# Patient Record
Sex: MALE | Race: WHITE | URBAN - METROPOLITAN AREA
[De-identification: names, ages, dates, MRNs, and addresses within clinical notes are randomized per-mention and may not be internally consistent; named-entity substitution may affect disease eponyms.]

---

## 2019-03-22 ENCOUNTER — INPATIENT (INPATIENT)
Facility: HOSPITAL | Age: 49
LOS: 1 days | Discharge: HOME | DRG: 247 | End: 2019-03-24
Attending: INTERNAL MEDICINE | Admitting: INTERNAL MEDICINE
Payer: COMMERCIAL

## 2019-03-22 VITALS
TEMPERATURE: 97 F | HEART RATE: 82 BPM | SYSTOLIC BLOOD PRESSURE: 176 MMHG | RESPIRATION RATE: 16 BRPM | OXYGEN SATURATION: 98 % | DIASTOLIC BLOOD PRESSURE: 101 MMHG

## 2019-03-22 DIAGNOSIS — Z91.89 OTHER SPECIFIED PERSONAL RISK FACTORS, NOT ELSEWHERE CLASSIFIED: ICD-10-CM

## 2019-03-22 DIAGNOSIS — I21.4 NON-ST ELEVATION (NSTEMI) MYOCARDIAL INFARCTION: ICD-10-CM

## 2019-03-22 DIAGNOSIS — Z95.5 PRESENCE OF CORONARY ANGIOPLASTY IMPLANT AND GRAFT: Chronic | ICD-10-CM

## 2019-03-22 DIAGNOSIS — I25.10 ATHEROSCLEROTIC HEART DISEASE OF NATIVE CORONARY ARTERY WITHOUT ANGINA PECTORIS: ICD-10-CM

## 2019-03-22 DIAGNOSIS — E11.9 TYPE 2 DIABETES MELLITUS WITHOUT COMPLICATIONS: ICD-10-CM

## 2019-03-22 DIAGNOSIS — R63.8 OTHER SYMPTOMS AND SIGNS CONCERNING FOOD AND FLUID INTAKE: ICD-10-CM

## 2019-03-22 LAB
ALBUMIN SERPL ELPH-MCNC: 4.5 G/DL — SIGNIFICANT CHANGE UP (ref 3.3–5)
ALP SERPL-CCNC: 79 U/L — SIGNIFICANT CHANGE UP (ref 40–120)
ALT FLD-CCNC: 26 U/L — SIGNIFICANT CHANGE UP (ref 10–45)
ANION GAP SERPL CALC-SCNC: 15 MMOL/L — SIGNIFICANT CHANGE UP (ref 5–17)
APTT BLD: 29 SEC — SIGNIFICANT CHANGE UP (ref 27.5–36.3)
AST SERPL-CCNC: 16 U/L — SIGNIFICANT CHANGE UP (ref 10–40)
BILIRUB SERPL-MCNC: 0.2 MG/DL — SIGNIFICANT CHANGE UP (ref 0.2–1.2)
BUN SERPL-MCNC: 15 MG/DL — SIGNIFICANT CHANGE UP (ref 7–23)
CALCIUM SERPL-MCNC: 9.6 MG/DL — SIGNIFICANT CHANGE UP (ref 8.4–10.5)
CHLORIDE SERPL-SCNC: 104 MMOL/L — SIGNIFICANT CHANGE UP (ref 96–108)
CK MB CFR SERPL CALC: 5.9 NG/ML — SIGNIFICANT CHANGE UP (ref 0–6.7)
CK SERPL-CCNC: 199 U/L — SIGNIFICANT CHANGE UP (ref 30–200)
CO2 SERPL-SCNC: 20 MMOL/L — LOW (ref 22–31)
CREAT SERPL-MCNC: 0.63 MG/DL — SIGNIFICANT CHANGE UP (ref 0.5–1.3)
GLUCOSE SERPL-MCNC: 205 MG/DL — HIGH (ref 70–99)
HCT VFR BLD CALC: 41.4 % — SIGNIFICANT CHANGE UP (ref 39–50)
HGB BLD-MCNC: 13.9 G/DL — SIGNIFICANT CHANGE UP (ref 13–17)
INR BLD: 1.1 — SIGNIFICANT CHANGE UP (ref 0.88–1.16)
MCHC RBC-ENTMCNC: 30.3 PG — SIGNIFICANT CHANGE UP (ref 27–34)
MCHC RBC-ENTMCNC: 33.6 GM/DL — SIGNIFICANT CHANGE UP (ref 32–36)
MCV RBC AUTO: 90.4 FL — SIGNIFICANT CHANGE UP (ref 80–100)
NRBC # BLD: 0 /100 WBCS — SIGNIFICANT CHANGE UP (ref 0–0)
PLATELET # BLD AUTO: 196 K/UL — SIGNIFICANT CHANGE UP (ref 150–400)
POTASSIUM SERPL-MCNC: 4 MMOL/L — SIGNIFICANT CHANGE UP (ref 3.5–5.3)
POTASSIUM SERPL-SCNC: 4 MMOL/L — SIGNIFICANT CHANGE UP (ref 3.5–5.3)
PROT SERPL-MCNC: 7.8 G/DL — SIGNIFICANT CHANGE UP (ref 6–8.3)
PROTHROM AB SERPL-ACNC: 12.5 SEC — SIGNIFICANT CHANGE UP (ref 10–12.9)
RBC # BLD: 4.58 M/UL — SIGNIFICANT CHANGE UP (ref 4.2–5.8)
RBC # FLD: 13 % — SIGNIFICANT CHANGE UP (ref 10.3–14.5)
SODIUM SERPL-SCNC: 139 MMOL/L — SIGNIFICANT CHANGE UP (ref 135–145)
TROPONIN T SERPL-MCNC: 0.06 NG/ML — CRITICAL HIGH (ref 0–0.01)
TROPONIN T SERPL-MCNC: 0.15 NG/ML — CRITICAL HIGH (ref 0–0.01)
TROPONIN T SERPL-MCNC: <0.01 NG/ML — SIGNIFICANT CHANGE UP (ref 0–0.01)
WBC # BLD: 9.09 K/UL — SIGNIFICANT CHANGE UP (ref 3.8–10.5)
WBC # FLD AUTO: 9.09 K/UL — SIGNIFICANT CHANGE UP (ref 3.8–10.5)

## 2019-03-22 PROCEDURE — 99291 CRITICAL CARE FIRST HOUR: CPT

## 2019-03-22 PROCEDURE — 93010 ELECTROCARDIOGRAM REPORT: CPT

## 2019-03-22 PROCEDURE — 92928 PRQ TCAT PLMT NTRAC ST 1 LES: CPT | Mod: LC

## 2019-03-22 PROCEDURE — 93458 L HRT ARTERY/VENTRICLE ANGIO: CPT | Mod: 26,XU

## 2019-03-22 PROCEDURE — 71045 X-RAY EXAM CHEST 1 VIEW: CPT | Mod: 26

## 2019-03-22 RX ORDER — DIPHENHYDRAMINE HCL 50 MG
50 CAPSULE ORAL ONCE
Qty: 0 | Refills: 0 | Status: DISCONTINUED | OUTPATIENT
Start: 2019-03-22 | End: 2019-03-23

## 2019-03-22 RX ORDER — FAMOTIDINE 10 MG/ML
20 INJECTION INTRAVENOUS ONCE
Qty: 0 | Refills: 0 | Status: COMPLETED | OUTPATIENT
Start: 2019-03-22 | End: 2019-03-22

## 2019-03-22 RX ORDER — TICAGRELOR 90 MG/1
180 TABLET ORAL ONCE
Qty: 0 | Refills: 0 | Status: COMPLETED | OUTPATIENT
Start: 2019-03-22 | End: 2019-03-22

## 2019-03-22 RX ORDER — HEPARIN SODIUM 5000 [USP'U]/ML
INJECTION INTRAVENOUS; SUBCUTANEOUS
Qty: 25000 | Refills: 0 | Status: DISCONTINUED | OUTPATIENT
Start: 2019-03-22 | End: 2019-03-22

## 2019-03-22 RX ORDER — LIDOCAINE 4 G/100G
15 CREAM TOPICAL ONCE
Qty: 0 | Refills: 0 | Status: COMPLETED | OUTPATIENT
Start: 2019-03-22 | End: 2019-03-22

## 2019-03-22 RX ORDER — TICAGRELOR 90 MG/1
90 TABLET ORAL EVERY 12 HOURS
Qty: 0 | Refills: 0 | Status: DISCONTINUED | OUTPATIENT
Start: 2019-03-23 | End: 2019-03-24

## 2019-03-22 RX ORDER — ASPIRIN/CALCIUM CARB/MAGNESIUM 324 MG
81 TABLET ORAL DAILY
Qty: 0 | Refills: 0 | Status: DISCONTINUED | OUTPATIENT
Start: 2019-03-23 | End: 2019-03-24

## 2019-03-22 RX ORDER — MORPHINE SULFATE 50 MG/1
4 CAPSULE, EXTENDED RELEASE ORAL ONCE
Qty: 0 | Refills: 0 | Status: DISCONTINUED | OUTPATIENT
Start: 2019-03-22 | End: 2019-03-22

## 2019-03-22 RX ORDER — NITROGLYCERIN 6.5 MG
0.4 CAPSULE, EXTENDED RELEASE ORAL
Qty: 0 | Refills: 0 | Status: DISCONTINUED | OUTPATIENT
Start: 2019-03-22 | End: 2019-03-23

## 2019-03-22 RX ORDER — NITROGLYCERIN 6.5 MG
5 CAPSULE, EXTENDED RELEASE ORAL
Qty: 50 | Refills: 0 | Status: DISCONTINUED | OUTPATIENT
Start: 2019-03-22 | End: 2019-03-23

## 2019-03-22 RX ORDER — ATORVASTATIN CALCIUM 80 MG/1
80 TABLET, FILM COATED ORAL ONCE
Qty: 0 | Refills: 0 | Status: COMPLETED | OUTPATIENT
Start: 2019-03-22 | End: 2019-03-22

## 2019-03-22 RX ORDER — ASPIRIN/CALCIUM CARB/MAGNESIUM 324 MG
162 TABLET ORAL ONCE
Qty: 0 | Refills: 0 | Status: COMPLETED | OUTPATIENT
Start: 2019-03-22 | End: 2019-03-22

## 2019-03-22 RX ORDER — HYDRALAZINE HCL 50 MG
10 TABLET ORAL ONCE
Qty: 0 | Refills: 0 | Status: COMPLETED | OUTPATIENT
Start: 2019-03-22 | End: 2019-03-22

## 2019-03-22 RX ORDER — HYDROCORTISONE 20 MG
200 TABLET ORAL ONCE
Qty: 0 | Refills: 0 | Status: COMPLETED | OUTPATIENT
Start: 2019-03-22 | End: 2019-03-22

## 2019-03-22 RX ORDER — HEPARIN SODIUM 5000 [USP'U]/ML
4000 INJECTION INTRAVENOUS; SUBCUTANEOUS ONCE
Qty: 0 | Refills: 0 | Status: COMPLETED | OUTPATIENT
Start: 2019-03-22 | End: 2019-03-22

## 2019-03-22 RX ORDER — LABETALOL HCL 100 MG
10 TABLET ORAL ONCE
Qty: 0 | Refills: 0 | Status: DISCONTINUED | OUTPATIENT
Start: 2019-03-22 | End: 2019-03-22

## 2019-03-22 RX ORDER — ONDANSETRON 8 MG/1
4 TABLET, FILM COATED ORAL EVERY 6 HOURS
Qty: 0 | Refills: 0 | Status: DISCONTINUED | OUTPATIENT
Start: 2019-03-22 | End: 2019-03-23

## 2019-03-22 RX ADMIN — LIDOCAINE 15 MILLILITER(S): 4 CREAM TOPICAL at 15:01

## 2019-03-22 RX ADMIN — Medication 10 MILLIGRAM(S): at 18:51

## 2019-03-22 RX ADMIN — HEPARIN SODIUM 4000 UNIT(S): 5000 INJECTION INTRAVENOUS; SUBCUTANEOUS at 19:02

## 2019-03-22 RX ADMIN — MORPHINE SULFATE 4 MILLIGRAM(S): 50 CAPSULE, EXTENDED RELEASE ORAL at 13:38

## 2019-03-22 RX ADMIN — FAMOTIDINE 20 MILLIGRAM(S): 10 INJECTION INTRAVENOUS at 15:01

## 2019-03-22 RX ADMIN — ATORVASTATIN CALCIUM 80 MILLIGRAM(S): 80 TABLET, FILM COATED ORAL at 21:41

## 2019-03-22 RX ADMIN — TICAGRELOR 180 MILLIGRAM(S): 90 TABLET ORAL at 18:47

## 2019-03-22 RX ADMIN — Medication 162 MILLIGRAM(S): at 13:38

## 2019-03-22 RX ADMIN — MORPHINE SULFATE 4 MILLIGRAM(S): 50 CAPSULE, EXTENDED RELEASE ORAL at 14:48

## 2019-03-22 RX ADMIN — Medication 200 MILLIGRAM(S): at 21:40

## 2019-03-22 RX ADMIN — Medication 0.4 MILLIGRAM(S): at 20:08

## 2019-03-22 RX ADMIN — HEPARIN SODIUM 1000 UNIT(S)/HR: 5000 INJECTION INTRAVENOUS; SUBCUTANEOUS at 19:02

## 2019-03-22 RX ADMIN — ONDANSETRON 4 MILLIGRAM(S): 8 TABLET, FILM COATED ORAL at 20:20

## 2019-03-22 RX ADMIN — Medication 30 MILLILITER(S): at 15:01

## 2019-03-22 RX ADMIN — MORPHINE SULFATE 4 MILLIGRAM(S): 50 CAPSULE, EXTENDED RELEASE ORAL at 20:19

## 2019-03-22 NOTE — ED ADULT NURSE REASSESSMENT NOTE - NS ED NURSE REASSESS COMMENT FT1
pt states "burning" pain in chest is worsening.  pt states when he had an MI the chest pain was "pressure".     Pt continues to report nausea.

## 2019-03-22 NOTE — ED PROVIDER NOTE - OBJECTIVE STATEMENT
hx of DM, CAD currently only on metformin, asa 81mg with complaints of chest pain for three days, worsening today while at lunch. Assoc radiation to L arm. Describes pain as burning in sensation to mid chest, no assoc sob, leg swelling.

## 2019-03-22 NOTE — H&P ADULT - NSHPPHYSICALEXAM_GEN_ALL_CORE
.  VITAL SIGNS:  T(C): 36.2 (03-22-19 @ 13:25), Max: 36.2 (03-22-19 @ 13:25)  T(F): 97.2 (03-22-19 @ 13:25), Max: 97.2 (03-22-19 @ 13:25)  HR: 48 (03-22-19 @ 20:31) (48 - 82)  BP: 105/68 (03-22-19 @ 20:31) (105/68 - 192/106)  BP(mean): --  RR: 20 (03-22-19 @ 20:31) (16 - 20)  SpO2: 99% (03-22-19 @ 20:03) (95% - 100%)  Wt(kg): --    PHYSICAL EXAM:    Constitutional: NAD  Head: NC/AT  Eyes: PERRL, EOMI, clear conjunctiva  ENT: no nasal discharge; uvula midline, no oropharyngeal erythema or exudates; MMM  Neck: supple; no JVD  Respiratory: CTA B/L; no W/R/R  Cardiac: +S1/S2; RRR; no M/R/G  Gastrointestinal: soft, NT/ND; no rebound or guarding; +BSx4  Extremities: Extremities warm. No erythema, no edema, no cyanosis b/l.  Vascular: 2+ radial, DP/PT pulses B/L  Neurologic: AAOx3; no focal deficits

## 2019-03-22 NOTE — ED PROVIDER NOTE - CLINICAL SUMMARY MEDICAL DECISION MAKING FREE TEXT BOX
Pt w CAD w complaints of CP, initial EKG wo STEMI and trop negative, but plan for admission for cont'd evaluation given hx and risk factors. After administering initial morphine and asa, pt stating CP free, comfortable appearing.

## 2019-03-22 NOTE — H&P ADULT - ASSESSMENT
Patient is a 48 year old M with PMH DM, CAD, who presented with 9/10 substernal chest pain x1 day with associated diaphoresis, nausea, vomiting and brief period of LOC found to have NSTEMI

## 2019-03-22 NOTE — H&P ADULT - PROBLEM SELECTOR PLAN 1
Patient with worsening and persistent chest pain since arrival to the ED, 10/10 and associated with nausea, vomiting, and diaphoresis that has persisted despite sublingual nitroglycerin. Briefly placed on nitro gtt but became bradycardic and nitro gtt was held. Repeat trops increased from 0.01 to 0.15 with worsening ST depressions in leads I, V4-V6. Patient s/p ASA and Brilinta load and started on heparin gtt  -patient going for cath tonight, f/u report  -c/w Lipitor 80mg   -c/w heparin gtt  -monitor PTTs q6hrs until therapeutic x3  -continue to trend cardiac enzymes until peak and serial EKGs q6-8hrs   -c/w Brilinta 90mg BID  -c/w ASA 81mg   -considering beta blocker and ACE/ARB. Holding for now  -f/u TSH  -f/u HbA1C  -f/u lipid panel Patient with worsening and persistent chest pain since arrival to the ED, 10/10 and associated with nausea, vomiting, and diaphoresis that has persisted despite sublingual nitroglycerin. Briefly placed on nitro gtt but became bradycardic and nitro gtt was held. Repeat trops increased from 0.01 to 0.15 with worsening ST depressions in leads I, V4-V6. Patient s/p ASA and Brilinta load and started on heparin gtt  -patient going for cath tonight, f/u report  -c/w Lipitor 80mg   -c/w heparin gtt, d/c after cath  -continue to trend cardiac enzymes until peak and serial EKGs q6-8hrs   -c/w Brilinta 90mg BID  -c/w ASA 81mg   -considering beta blocker and ACE/ARB. Holding for now  -f/u TSH  -f/u HbA1C  -f/u lipid panel  -groin check after procedure

## 2019-03-22 NOTE — H&P ADULT - PROBLEM SELECTOR PLAN 5
1) PCP Contacted on Admission: (Y/N) --> Name & Phone #:  Georgi Garcia (Cardiologist): [phone]  2) Date of Contact with PCP: Will need contact in AM vs. when office is open   3) PCP Contacted at Discharge: (Y/N, N/A)  4) Summary of Handoff Given to PCP:   5) Post-Discharge Appointment Date and Location: [facility]

## 2019-03-22 NOTE — ED PROVIDER NOTE - PROGRESS NOTE DETAILS
at time of admitting pt, pt now w worsening chest, rpt EKG obtained showing STD in lateral leads, rpt trop, nitro SL, morphine administered. Relayed to inpt team, fellow and resident to evaluate pt, will change admission to ICU.

## 2019-03-22 NOTE — ED ADULT NURSE NOTE - OBJECTIVE STATEMENT
pt reports nausea and "burning" chest pain onset 1140 today while at work.  pt does physical labor at work.    Denies vomiting.    pt denies SOB.   pt denies fevers.   pt reports headache after receiving NTG x 3 by EMS.   Pt denies relief of chest pain with NTG.   pt has hx of MI with cardiac stents.

## 2019-03-22 NOTE — H&P ADULT - NSHPSOCIALHISTORY_GEN_ALL_CORE
Tobacco use: Smoked 1.5 packs per day for 32 years. Recently cut back to 2-3 cigs/day, actively smoking.  EtOH use: Denies  Illicit drug use: Denies

## 2019-03-22 NOTE — ED PROVIDER NOTE - CRITICAL CARE PROVIDED
documentation/consultation with other physicians/interpretation of diagnostic studies/direct patient care (not related to procedure)

## 2019-03-22 NOTE — H&P ADULT - ATTENDING COMMENTS
Assessment: Patient personally seen and examined myself during rounds with the House Staff/Fellow  ON DATE 3/22/19  House Staff/Fellow note read, including vitals, physical findings, laboratory data, and radiological reports.   Revisions included below.   Direct personal management at bed side and extensive interpretation of the data.    Plan was outlined and discussed in details with the House Staff/Fellow.    Decision making of high complexity   Risk high of complications, morbidity, and/or mortality  Assessment and Action taken for acute disease activity to reflect the level of care provided:  -Hemodynamic evaluation and support  -ACS assessment and treatment as applicable  -Heart failure assessment and treatment as applicable  -Cardiac Telemetry reviewed  -Medication reconciliation  -Review laboratory data  -EKG reviewed   -Echo reviewed  -Interdisciplinary discussion with IC / EP / HF / CTS teams as needed  My plan includes :  close hemodynamic monitoring and management   Monitor for arrhythmias and monitor parameters for organ perfusion  monitor neurologic status  Head of the bed should remain elevated to 45 deg .   chest PT and IS will be encouraged  monitor adequacy of oxygenation and ventilation and attempt to wean oxygen  Nutritional goals will be met using po eventually , ensure adequate caloric intake and montior the same  Stress ulcer and VTE prophylaxis will be achieved    Glycemic control is satisfactory  Electrolytes have been replete as necessary and wound care has been carried out. Pain control has been achieved.   aggressive physical therapy and early mobility and ambulation goals will be met   The family was updated about the course and plan  CRITICAL CARE TIME SPENT in evaluation and management, reassessments, review and interpretation of labs and x-rays, ventilator and hemodynamic management, formulating a plan and coordinating care: ___90____ MIN.  Time does not include procedural time.    Carlie Ramirez MD  CCU ATTENDING  Mobile: [phone]

## 2019-03-22 NOTE — H&P ADULT - PROBLEM SELECTOR PLAN 3
Per Patient’s history. Patient is on Metformin 1000mg BID at home  -holding home PO meds while hospitalized  -f/u FSs  -ISS for now  -dose lantus and premeal insulin per ISS requirements

## 2019-03-22 NOTE — H&P ADULT - HISTORY OF PRESENT ILLNESS
Patient is a 48 year old M with PMH DM, CAD, who presented with 3 days chest pain, radiating down his L arm, acutely worsened since midday. At time of presentation in [facility] ED, VS T 36.2, P82, 176/101, RR16, sat 98% on room air. Labs with CBC WNL, CMP WNL. He initially was admitted to cardiac stepdown unit for r/o NSTEMI, however his chest pain worsened to 10/10, substernal, and he had rising troponins from <0.01 to 0.06 to 0.15. He was started on a nitroglycerin drip but became bradycardic to 40s. He was given ASA and brilinta, started on heparin drip. Will be taken for urgent catheterization. Patient is a 48 year old M with PMH DM, CAD, who presented with 9/10 substernal chest pain x1 day with associated diaphoresis, nausea, vomiting and brief period of LOC. 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 Patient subsequently went home where he became nauseous, vomited, and stated that he cannot recall the following events until his arrival to [facility] ED. Patient stated that his chest pain felt similar to his prior MI. Denied SOB, palpitations, exertional chest pain. Pain was not relieved by rest or sublingual nitroglycerin. At time of presentation in [facility] ED, VS T 36.2, P82, 176/101, RR16, sat 98% on room air. Labs with CBC WNL, CMP WNL. He initially was admitted to cardiac stepdown unit for r/o NSTEMI, however his chest pain worsened to 10/10, substernal, and he had rising troponins from <0.01 to 0.06 to 0.15 and EKG demonstrated worsening ST depressions in I, V4-V6. He was started on a nitroglycerin drip but became bradycardic to 40s. He was given ASA and brilinta, started on heparin drip and admitted to CCU. Will be taken for urgent catheterization given patient with persistent chest pain, although improved 1-2/10.

## 2019-03-22 NOTE — H&P ADULT - NSHPLABSRESULTS_GEN_ALL_CORE
.  LABS:                         13.9   9.09  )-----------( 196      ( 22 Mar 2019 13:41 )             41.4     03-22    139  |  104  |  15  ----------------------------<  205<H>  4.0   |  20<L>  |  0.63    Ca    9.6      22 Mar 2019 13:41    TPro  7.8  /  Alb  4.5  /  TBili  0.2  /  DBili  x   /  AST  16  /  ALT  26  /  AlkPhos  79  03-22    PT/INR - ( 22 Mar 2019 13:41 )   PT: 12.5 sec;   INR: 1.10          PTT - ( 22 Mar 2019 13:41 )  PTT:29.0 sec    CARDIAC MARKERS ( 22 Mar 2019 20:26 )  x     / 0.15 ng/mL / x     / x     / x      CARDIAC MARKERS ( 22 Mar 2019 17:26 )  x     / 0.06 ng/mL / 199 U/L / x     / 5.9 ng/mL  CARDIAC MARKERS ( 22 Mar 2019 13:41 )  x     / <0.01 ng/mL / x     / x     / x                RADIOLOGY, EKG & ADDITIONAL TESTS: Reviewed. .  LABS:                         13.9   9.09  )-----------( 196      ( 22 Mar 2019 13:41 )             41.4     03-22    139  |  104  |  15  ----------------------------<  205<H>  4.0   |  20<L>  |  0.63    Ca    9.6      22 Mar 2019 13:41    TPro  7.8  /  Alb  4.5  /  TBili  0.2  /  DBili  x   /  AST  16  /  ALT  26  /  AlkPhos  79  03-22    PT/INR - ( 22 Mar 2019 13:41 )   PT: 12.5 sec;   INR: 1.10          PTT - ( 22 Mar 2019 13:41 )  PTT:29.0 sec    CARDIAC MARKERS ( 22 Mar 2019 20:26 )  x     / 0.15 ng/mL / x     / x     / x      CARDIAC MARKERS ( 22 Mar 2019 17:26 )  x     / 0.06 ng/mL / 199 U/L / x     / 5.9 ng/mL  CARDIAC MARKERS ( 22 Mar 2019 13:41 )  x     / <0.01 ng/mL / x     / x     / x          RADIOLOGY, EKG & ADDITIONAL TESTS: Reviewed.

## 2019-03-22 NOTE — ED ADULT TRIAGE NOTE - CHIEF COMPLAINT QUOTE
Pt called in by ems as notification for non-stemi - pt c/o chest pain while at work today, not relieved with nitro, associated w near syncopal episode. Pt received 162 mg of aspirin and three nitro tabs from ems. Currently reports "burning" pain 8/10. EKG in progress. L AC #18 in place, hx of HTN, DM, AD w PCI x 3. Pt called in by ems as notification for NSTEMI - pt c/o chest pain while at work today, not relieved with nitro, associated w near syncopal episode. Pt received 162 mg of aspirin and three nitro tabs from ems. Currently reports "burning" pain 8/10. EKG in progress. L AC #18 in place, hx of HTN, DM, AD w PCI x 3.

## 2019-03-22 NOTE — H&P ADULT - PROBLEM SELECTOR PLAN 2
Patient with a history of CAD s/p 3 stents to LAD. 1 Stent was placed in 2017 and 2 more placed in 2018. Patient was on ASA and Plavix but Plavix was d/calixto 6 months ago per outside Cardiologist. Still on home ASA. Patient now presenting with NSTEMI as described above  -c/w plan as above

## 2019-03-22 NOTE — PROGRESS NOTE ADULT - SUBJECTIVE AND OBJECTIVE BOX
PROCEDURE: CORONARY ANGIOGRAM AND PCI  INDICATION: NSTEMI  ATTENDING: MANE  ACCESS: RRA    FIDINGS:   LM= normal   LCx= 100%   LAD= patent stent in prox mid and distal LAD with 10% ISR  D1= small vessel with 90% ostial lesion (jailed by LAD stent)  mRCA=40%    LVEF= 60%  LVEDP= 14 mmHg    INTERVENTION  PCI of LCX with MICHEAL x2 (2.5 x 26 and 3.0 x 18 post dilated with 3.0 x8 NC balloon) with restoration of ABRAHAM 3 flow    A/P  -aspirin 81 mg indefinitely   -brilinta 90 mg po bid for at least one year  -atorvastatin 80 mg daily   -smoking cessation

## 2019-03-22 NOTE — ED ADULT NURSE REASSESSMENT NOTE - NS ED NURSE REASSESS COMMENT FT1
RN called to bedside.  pt reports severe midsternal chest pain.  Rates 10/10.  pt appears anxious and uncomfortable.   Dr Dobson to bedside.  Repeat EKG done given to Dr. Dobson.   Vital signs currently stable.  NSR noted.

## 2019-03-22 NOTE — ED ADULT NURSE REASSESSMENT NOTE - NS ED NURSE REASSESS COMMENT FT1
cardiology fellow at bedside.  pt now sinus tommie.   /68. cardiology fellow at bedside.  pt now sinus tommie.   /68.  NTG gtt held because of VS change.   night shift RN Isabella received hand off and at beside with patient.

## 2019-03-22 NOTE — ED ADULT NURSE NOTE - CHIEF COMPLAINT QUOTE
Pt called in by ems as notification for NSTEMI - pt c/o chest pain while at work today, not relieved with nitro, associated w near syncopal episode. Pt received 162 mg of aspirin and three nitro tabs from ems. Currently reports "burning" pain 8/10. EKG in progress. L AC #18 in place, hx of HTN, DM, AD w PCI x 3.

## 2019-03-23 LAB
ANION GAP SERPL CALC-SCNC: 13 MMOL/L — SIGNIFICANT CHANGE UP (ref 5–17)
APTT BLD: 146.9 SEC — CRITICAL HIGH (ref 27.5–36.3)
BLD GP AB SCN SERPL QL: NEGATIVE — SIGNIFICANT CHANGE UP
BUN SERPL-MCNC: 18 MG/DL — SIGNIFICANT CHANGE UP (ref 7–23)
CALCIUM SERPL-MCNC: 9.5 MG/DL — SIGNIFICANT CHANGE UP (ref 8.4–10.5)
CHLORIDE SERPL-SCNC: 102 MMOL/L — SIGNIFICANT CHANGE UP (ref 96–108)
CHOLEST SERPL-MCNC: 182 MG/DL — SIGNIFICANT CHANGE UP (ref 10–199)
CO2 SERPL-SCNC: 21 MMOL/L — LOW (ref 22–31)
CREAT SERPL-MCNC: 0.57 MG/DL — SIGNIFICANT CHANGE UP (ref 0.5–1.3)
GLUCOSE BLDC GLUCOMTR-MCNC: 144 MG/DL — HIGH (ref 70–99)
GLUCOSE BLDC GLUCOMTR-MCNC: 145 MG/DL — HIGH (ref 70–99)
GLUCOSE BLDC GLUCOMTR-MCNC: 150 MG/DL — HIGH (ref 70–99)
GLUCOSE BLDC GLUCOMTR-MCNC: 231 MG/DL — HIGH (ref 70–99)
GLUCOSE SERPL-MCNC: 191 MG/DL — HIGH (ref 70–99)
HBA1C BLD-MCNC: 7.8 % — HIGH (ref 4–5.6)
HCT VFR BLD CALC: 39.3 % — SIGNIFICANT CHANGE UP (ref 39–50)
HCT VFR BLD CALC: 41.1 % — SIGNIFICANT CHANGE UP (ref 39–50)
HDLC SERPL-MCNC: 40 MG/DL — SIGNIFICANT CHANGE UP
HGB BLD-MCNC: 13.3 G/DL — SIGNIFICANT CHANGE UP (ref 13–17)
HGB BLD-MCNC: 13.8 G/DL — SIGNIFICANT CHANGE UP (ref 13–17)
LIPID PNL WITH DIRECT LDL SERPL: 107 MG/DL — SIGNIFICANT CHANGE UP
MAGNESIUM SERPL-MCNC: 2 MG/DL — SIGNIFICANT CHANGE UP (ref 1.6–2.6)
MCHC RBC-ENTMCNC: 30.4 PG — SIGNIFICANT CHANGE UP (ref 27–34)
MCHC RBC-ENTMCNC: 30.9 PG — SIGNIFICANT CHANGE UP (ref 27–34)
MCHC RBC-ENTMCNC: 33.6 GM/DL — SIGNIFICANT CHANGE UP (ref 32–36)
MCHC RBC-ENTMCNC: 33.8 GM/DL — SIGNIFICANT CHANGE UP (ref 32–36)
MCV RBC AUTO: 90.5 FL — SIGNIFICANT CHANGE UP (ref 80–100)
MCV RBC AUTO: 91.2 FL — SIGNIFICANT CHANGE UP (ref 80–100)
NRBC # BLD: 0 /100 WBCS — SIGNIFICANT CHANGE UP (ref 0–0)
NRBC # BLD: 0 /100 WBCS — SIGNIFICANT CHANGE UP (ref 0–0)
PCP SPEC-MCNC: SIGNIFICANT CHANGE UP
PHOSPHATE SERPL-MCNC: 3.7 MG/DL — SIGNIFICANT CHANGE UP (ref 2.5–4.5)
PLATELET # BLD AUTO: 188 K/UL — SIGNIFICANT CHANGE UP (ref 150–400)
PLATELET # BLD AUTO: 188 K/UL — SIGNIFICANT CHANGE UP (ref 150–400)
POTASSIUM SERPL-MCNC: 4.4 MMOL/L — SIGNIFICANT CHANGE UP (ref 3.5–5.3)
POTASSIUM SERPL-SCNC: 4.4 MMOL/L — SIGNIFICANT CHANGE UP (ref 3.5–5.3)
RBC # BLD: 4.31 M/UL — SIGNIFICANT CHANGE UP (ref 4.2–5.8)
RBC # BLD: 4.54 M/UL — SIGNIFICANT CHANGE UP (ref 4.2–5.8)
RBC # FLD: 13 % — SIGNIFICANT CHANGE UP (ref 10.3–14.5)
RBC # FLD: 13.1 % — SIGNIFICANT CHANGE UP (ref 10.3–14.5)
RH IG SCN BLD-IMP: POSITIVE — SIGNIFICANT CHANGE UP
SODIUM SERPL-SCNC: 136 MMOL/L — SIGNIFICANT CHANGE UP (ref 135–145)
TOTAL CHOLESTEROL/HDL RATIO MEASUREMENT: 4.6 RATIO — SIGNIFICANT CHANGE UP (ref 3.4–9.6)
TRIGL SERPL-MCNC: 173 MG/DL — HIGH (ref 10–149)
TSH SERPL-MCNC: 0.26 UIU/ML — LOW (ref 0.35–4.94)
WBC # BLD: 10.88 K/UL — HIGH (ref 3.8–10.5)
WBC # BLD: 12.64 K/UL — HIGH (ref 3.8–10.5)
WBC # FLD AUTO: 10.88 K/UL — HIGH (ref 3.8–10.5)
WBC # FLD AUTO: 12.64 K/UL — HIGH (ref 3.8–10.5)

## 2019-03-23 PROCEDURE — 93306 TTE W/DOPPLER COMPLETE: CPT | Mod: 26

## 2019-03-23 PROCEDURE — 99291 CRITICAL CARE FIRST HOUR: CPT

## 2019-03-23 PROCEDURE — 93010 ELECTROCARDIOGRAM REPORT: CPT

## 2019-03-23 PROCEDURE — 71045 X-RAY EXAM CHEST 1 VIEW: CPT | Mod: 26

## 2019-03-23 RX ORDER — DEXTROSE 50 % IN WATER 50 %
15 SYRINGE (ML) INTRAVENOUS ONCE
Qty: 0 | Refills: 0 | Status: DISCONTINUED | OUTPATIENT
Start: 2019-03-23 | End: 2019-03-24

## 2019-03-23 RX ORDER — DEXTROSE 50 % IN WATER 50 %
25 SYRINGE (ML) INTRAVENOUS ONCE
Qty: 0 | Refills: 0 | Status: DISCONTINUED | OUTPATIENT
Start: 2019-03-23 | End: 2019-03-24

## 2019-03-23 RX ORDER — INSULIN LISPRO 100/ML
VIAL (ML) SUBCUTANEOUS
Qty: 0 | Refills: 0 | Status: DISCONTINUED | OUTPATIENT
Start: 2019-03-23 | End: 2019-03-24

## 2019-03-23 RX ORDER — LISINOPRIL 2.5 MG/1
5 TABLET ORAL DAILY
Qty: 0 | Refills: 0 | Status: DISCONTINUED | OUTPATIENT
Start: 2019-03-23 | End: 2019-03-24

## 2019-03-23 RX ORDER — SODIUM CHLORIDE 9 MG/ML
1000 INJECTION, SOLUTION INTRAVENOUS
Qty: 0 | Refills: 0 | Status: DISCONTINUED | OUTPATIENT
Start: 2019-03-23 | End: 2019-03-24

## 2019-03-23 RX ORDER — HEPARIN SODIUM 5000 [USP'U]/ML
5000 INJECTION INTRAVENOUS; SUBCUTANEOUS EVERY 8 HOURS
Qty: 0 | Refills: 0 | Status: DISCONTINUED | OUTPATIENT
Start: 2019-03-23 | End: 2019-03-24

## 2019-03-23 RX ORDER — ATORVASTATIN CALCIUM 80 MG/1
80 TABLET, FILM COATED ORAL AT BEDTIME
Qty: 0 | Refills: 0 | Status: DISCONTINUED | OUTPATIENT
Start: 2019-03-23 | End: 2019-03-24

## 2019-03-23 RX ORDER — DEXTROSE 50 % IN WATER 50 %
12.5 SYRINGE (ML) INTRAVENOUS ONCE
Qty: 0 | Refills: 0 | Status: DISCONTINUED | OUTPATIENT
Start: 2019-03-23 | End: 2019-03-24

## 2019-03-23 RX ORDER — METOPROLOL TARTRATE 50 MG
12.5 TABLET ORAL EVERY 12 HOURS
Qty: 0 | Refills: 0 | Status: DISCONTINUED | OUTPATIENT
Start: 2019-03-23 | End: 2019-03-24

## 2019-03-23 RX ORDER — GLUCAGON INJECTION, SOLUTION 0.5 MG/.1ML
1 INJECTION, SOLUTION SUBCUTANEOUS ONCE
Qty: 0 | Refills: 0 | Status: DISCONTINUED | OUTPATIENT
Start: 2019-03-23 | End: 2019-03-24

## 2019-03-23 RX ORDER — METOPROLOL TARTRATE 50 MG
12.5 TABLET ORAL EVERY 12 HOURS
Qty: 0 | Refills: 0 | Status: DISCONTINUED | OUTPATIENT
Start: 2019-03-23 | End: 2019-03-23

## 2019-03-23 RX ORDER — CHLORHEXIDINE GLUCONATE 213 G/1000ML
1 SOLUTION TOPICAL
Qty: 0 | Refills: 0 | Status: DISCONTINUED | OUTPATIENT
Start: 2019-03-23 | End: 2019-03-24

## 2019-03-23 RX ORDER — ACETAMINOPHEN 500 MG
650 TABLET ORAL ONCE
Qty: 0 | Refills: 0 | Status: COMPLETED | OUTPATIENT
Start: 2019-03-23 | End: 2019-03-23

## 2019-03-23 RX ADMIN — Medication 650 MILLIGRAM(S): at 11:38

## 2019-03-23 RX ADMIN — HEPARIN SODIUM 5000 UNIT(S): 5000 INJECTION INTRAVENOUS; SUBCUTANEOUS at 22:13

## 2019-03-23 RX ADMIN — TICAGRELOR 90 MILLIGRAM(S): 90 TABLET ORAL at 18:01

## 2019-03-23 RX ADMIN — HEPARIN SODIUM 5000 UNIT(S): 5000 INJECTION INTRAVENOUS; SUBCUTANEOUS at 14:12

## 2019-03-23 RX ADMIN — Medication 650 MILLIGRAM(S): at 11:32

## 2019-03-23 RX ADMIN — TICAGRELOR 90 MILLIGRAM(S): 90 TABLET ORAL at 06:22

## 2019-03-23 RX ADMIN — Medication 12.5 MILLIGRAM(S): at 17:19

## 2019-03-23 RX ADMIN — HEPARIN SODIUM 5000 UNIT(S): 5000 INJECTION INTRAVENOUS; SUBCUTANEOUS at 06:22

## 2019-03-23 RX ADMIN — Medication 81 MILLIGRAM(S): at 11:36

## 2019-03-23 RX ADMIN — ATORVASTATIN CALCIUM 80 MILLIGRAM(S): 80 TABLET, FILM COATED ORAL at 22:13

## 2019-03-23 RX ADMIN — Medication 12.5 MILLIGRAM(S): at 13:09

## 2019-03-23 RX ADMIN — Medication 4: at 17:18

## 2019-03-23 RX ADMIN — LISINOPRIL 5 MILLIGRAM(S): 2.5 TABLET ORAL at 17:19

## 2019-03-23 NOTE — PROGRESS NOTE ADULT - SUBJECTIVE AND OBJECTIVE BOX
INTERVENTIONAL CARDIOLOGY FOLLOW UP NOTE    -Patient seen and examined this am    -No events overnight    -No complaints this am    VASCULAR ACCESS EXAM:    FEMORAL:    2+ right/left femoral pulse    2+ DP/PT pulses.    -Right femoral Access site clean, non-tender, without ecchymosis or hematoma.    A/P: Patient is a 48 year old M with PMH DMII  and CAD now s/p PCI of dLCx with MICHEAL via right femoral approach with no evidence of vascular complications post procedure.    -continue with current medications including dual antiplatelet therapy

## 2019-03-23 NOTE — PROGRESS NOTE ADULT - SUBJECTIVE AND OBJECTIVE BOX
OVERNIGHT EVENTS:    SUBJECTIVE / INTERVAL HPI: Patient seen and examined at bedside.     VITAL SIGNS:  Vital Signs Last 24 Hrs  T(C): 36.9 (23 Mar 2019 09:00), Max: 37.1 (23 Mar 2019 01:17)  T(F): 98.5 (23 Mar 2019 09:00), Max: 98.8 (23 Mar 2019 01:17)  HR: 70 (23 Mar 2019 09:00) (48 - 82)  BP: 121/80 (23 Mar 2019 09:00) (100/71 - 192/106)  BP(mean): 93 (23 Mar 2019 09:00) (73 - 120)  RR: 25 (23 Mar 2019 09:00) (12 - 28)  SpO2: 99% (23 Mar 2019 09:00) (95% - 100%)    PHYSICAL EXAM:    General: WDWN  HEENT: NC/AT; PERRL, anicteric sclera; MMM  Neck: supple  Cardiovascular: +S1/S2, RRR  Respiratory: CTA B/L; no W/R/R  Gastrointestinal: soft, NT/ND; +BSx4  Extremities: WWP; no edema, clubbing or cyanosis  Vascular: 2+ radial, DP/PT pulses B/L  Neurological: AAOx3; no focal deficits    MEDICATIONS:  MEDICATIONS  (STANDING):  aspirin  chewable 81 milliGRAM(s) Oral daily  atorvastatin 80 milliGRAM(s) Oral at bedtime  dextrose 5%. 1000 milliLiter(s) (50 mL/Hr) IV Continuous <Continuous>  dextrose 50% Injectable 12.5 Gram(s) IV Push once  dextrose 50% Injectable 25 Gram(s) IV Push once  dextrose 50% Injectable 25 Gram(s) IV Push once  heparin  Injectable 5000 Unit(s) SubCutaneous every 8 hours  insulin lispro (HumaLOG) corrective regimen sliding scale   SubCutaneous Before meals and at bedtime  ticagrelor 90 milliGRAM(s) Oral every 12 hours    MEDICATIONS  (PRN):  dextrose 40% Gel 15 Gram(s) Oral once PRN Blood Glucose LESS THAN 70 milliGRAM(s)/deciliter  diphenhydrAMINE   Injectable 50 milliGRAM(s) IV Push once PRN Allergy symptoms  glucagon  Injectable 1 milliGRAM(s) IntraMuscular once PRN Glucose LESS THAN 70 milligrams/deciliter      ALLERGIES:  Allergies    No Known Drug Allergies  Seafood (Unknown)    Intolerances        LABS:                        13.8   10.88 )-----------( 188      ( 23 Mar 2019 04:19 )             41.1     03-23    136  |  102  |  18  ----------------------------<  191<H>  4.4   |  21<L>  |  0.57    Ca    9.5      23 Mar 2019 04:19  Phos  3.7     03-23  Mg     2.0     03-23    TPro  7.8  /  Alb  4.5  /  TBili  0.2  /  DBili  x   /  AST  16  /  ALT  26  /  AlkPhos  79  03-22    PT/INR - ( 22 Mar 2019 13:41 )   PT: 12.5 sec;   INR: 1.10          PTT - ( 23 Mar 2019 01:21 )  PTT:146.9 sec    CAPILLARY BLOOD GLUCOSE      POCT Blood Glucose.: 150 mg/dL (23 Mar 2019 11:26)      RADIOLOGY & ADDITIONAL TESTS: Reviewed. OVERNIGHT EVENTS: s/p cath w 2 MICHEAL to LCx    SUBJECTIVE / INTERVAL HPI: Patient seen and examined at bedside. pt asymptomatic this AM. no CP, SOB, palpitations, lightheadedness, dizziness, n/v. slight headache this AM otherwise ROS negative    VITAL SIGNS:  Vital Signs Last 24 Hrs  T(C): 36.9 (23 Mar 2019 09:00), Max: 37.1 (23 Mar 2019 01:17)  T(F): 98.5 (23 Mar 2019 09:00), Max: 98.8 (23 Mar 2019 01:17)  HR: 70 (23 Mar 2019 09:00) (48 - 82)  BP: 121/80 (23 Mar 2019 09:00) (100/71 - 192/106)  BP(mean): 93 (23 Mar 2019 09:00) (73 - 120)  RR: 25 (23 Mar 2019 09:00) (12 - 28)  SpO2: 99% (23 Mar 2019 09:00) (95% - 100%)    PHYSICAL EXAM:    General: WDWN adult male in NAD  HEENT: NC/AT; PERRL, anicteric sclera; MMM  Neck: supple  Cardiovascular: +S1/S2, RRR  Respiratory: CTA B/L; no W/R/R  Gastrointestinal: soft, NT/ND; +BSx4  Extremities: WWP; no edema, clubbing or cyanosis  Vascular: 2+ radial, DP/PT pulses B/L  Neurological: AAOx3; no focal deficits    MEDICATIONS:  MEDICATIONS  (STANDING):  aspirin  chewable 81 milliGRAM(s) Oral daily  atorvastatin 80 milliGRAM(s) Oral at bedtime  dextrose 5%. 1000 milliLiter(s) (50 mL/Hr) IV Continuous <Continuous>  dextrose 50% Injectable 12.5 Gram(s) IV Push once  dextrose 50% Injectable 25 Gram(s) IV Push once  dextrose 50% Injectable 25 Gram(s) IV Push once  heparin  Injectable 5000 Unit(s) SubCutaneous every 8 hours  insulin lispro (HumaLOG) corrective regimen sliding scale   SubCutaneous Before meals and at bedtime  ticagrelor 90 milliGRAM(s) Oral every 12 hours    MEDICATIONS  (PRN):  dextrose 40% Gel 15 Gram(s) Oral once PRN Blood Glucose LESS THAN 70 milliGRAM(s)/deciliter  diphenhydrAMINE   Injectable 50 milliGRAM(s) IV Push once PRN Allergy symptoms  glucagon  Injectable 1 milliGRAM(s) IntraMuscular once PRN Glucose LESS THAN 70 milligrams/deciliter      ALLERGIES:  Allergies    No Known Drug Allergies  Seafood (Unknown)    Intolerances        LABS:                        13.8   10.88 )-----------( 188      ( 23 Mar 2019 04:19 )             41.1     03-23    136  |  102  |  18  ----------------------------<  191<H>  4.4   |  21<L>  |  0.57    Ca    9.5      23 Mar 2019 04:19  Phos  3.7     03-23  Mg     2.0     03-23    TPro  7.8  /  Alb  4.5  /  TBili  0.2  /  DBili  x   /  AST  16  /  ALT  26  /  AlkPhos  79  03-22    PT/INR - ( 22 Mar 2019 13:41 )   PT: 12.5 sec;   INR: 1.10          PTT - ( 23 Mar 2019 01:21 )  PTT:146.9 sec    CAPILLARY BLOOD GLUCOSE      POCT Blood Glucose.: 150 mg/dL (23 Mar 2019 11:26)      RADIOLOGY & ADDITIONAL TESTS: Reviewed.    CXR without acute infiltrates TRANSFER NOTE: CCU to [address] [place] [address] Course:  47 yo M w DM and CAD admitted w CP x1 day associated w n/v and diaphoresis admitted for NSTEMI s/p PCI w 2 MICHEAL to LCx. Pt remained stable and CP free after cath. Started lopressor 12.5 BID and pt's pressures tolerated well so also initiated lisinopril 5 daily. Stable for stepdown to tele unit.    OVERNIGHT EVENTS: s/p cath w 2 MICHEAL to LCx    SUBJECTIVE / INTERVAL HPI: Patient seen and examined at bedside. pt asymptomatic this AM. no CP, SOB, palpitations, lightheadedness, dizziness, n/v. slight headache this AM otherwise ROS negative    VITAL SIGNS:  Vital Signs Last 24 Hrs  T(C): 36.9 (23 Mar 2019 09:00), Max: 37.1 (23 Mar 2019 01:17)  T(F): 98.5 (23 Mar 2019 09:00), Max: 98.8 (23 Mar 2019 01:17)  HR: 70 (23 Mar 2019 09:00) (48 - 82)  BP: 121/80 (23 Mar 2019 09:00) (100/71 - 192/106)  BP(mean): 93 (23 Mar 2019 09:00) (73 - 120)  RR: 25 (23 Mar 2019 09:00) (12 - 28)  SpO2: 99% (23 Mar 2019 09:00) (95% - 100%)    PHYSICAL EXAM:    General: WDWN adult male in NAD  HEENT: NC/AT; PERRL, anicteric sclera; MMM  Neck: supple  Cardiovascular: +S1/S2, RRR  Respiratory: CTA B/L; no W/R/R  Gastrointestinal: soft, NT/ND; +BSx4  Extremities: WWP; no edema, clubbing or cyanosis  Vascular: 2+ radial, DP/PT pulses B/L  Neurological: AAOx3; no focal deficits    MEDICATIONS:  MEDICATIONS  (STANDING):  aspirin  chewable 81 milliGRAM(s) Oral daily  atorvastatin 80 milliGRAM(s) Oral at bedtime  dextrose 5%. 1000 milliLiter(s) (50 mL/Hr) IV Continuous <Continuous>  dextrose 50% Injectable 12.5 Gram(s) IV Push once  dextrose 50% Injectable 25 Gram(s) IV Push once  dextrose 50% Injectable 25 Gram(s) IV Push once  heparin  Injectable 5000 Unit(s) SubCutaneous every 8 hours  insulin lispro (HumaLOG) corrective regimen sliding scale   SubCutaneous Before meals and at bedtime  ticagrelor 90 milliGRAM(s) Oral every 12 hours    MEDICATIONS  (PRN):  dextrose 40% Gel 15 Gram(s) Oral once PRN Blood Glucose LESS THAN 70 milliGRAM(s)/deciliter  diphenhydrAMINE   Injectable 50 milliGRAM(s) IV Push once PRN Allergy symptoms  glucagon  Injectable 1 milliGRAM(s) IntraMuscular once PRN Glucose LESS THAN 70 milligrams/deciliter      ALLERGIES:  Allergies    No Known Drug Allergies  Seafood (Unknown)    Intolerances        LABS:                        13.8   10.88 )-----------( 188      ( 23 Mar 2019 04:19 )             41.1     03-23    136  |  102  |  18  ----------------------------<  191<H>  4.4   |  21<L>  |  0.57    Ca    9.5      23 Mar 2019 04:19  Phos  3.7     03-23  Mg     2.0     03-23    TPro  7.8  /  Alb  4.5  /  TBili  0.2  /  DBili  x   /  AST  16  /  ALT  26  /  AlkPhos  79  03-22    PT/INR - ( 22 Mar 2019 13:41 )   PT: 12.5 sec;   INR: 1.10          PTT - ( 23 Mar 2019 01:21 )  PTT:146.9 sec    CAPILLARY BLOOD GLUCOSE      POCT Blood Glucose.: 150 mg/dL (23 Mar 2019 11:26)      RADIOLOGY & ADDITIONAL TESTS: Reviewed.    CXR without acute infiltrates

## 2019-03-23 NOTE — PROGRESS NOTE ADULT - ASSESSMENT
48 yr old M current smoker with PMHx of DM, CAD, presented with chest pain associated with N/V, diaphoresis, R/I NSTEMI, s/p cath with successful MICHEAL x 2 to LCx, now deemed stable for stepdown for 5URIS for cardiac telemetry and discharge planning.

## 2019-03-23 NOTE — PROGRESS NOTE ADULT - PROBLEM SELECTOR PLAN 2
-- patient on Metformin 1000mg PO BID at home, currently on hold while inpatient.  -- will continue FS's and ICS PRN.    DVT PPx: Heparin subcut

## 2019-03-23 NOTE — PROGRESS NOTE ADULT - ATTENDING COMMENTS
Assessment: Patient personally seen and examined myself during rounds with the House Staff/Fellow  ON DATE 3/23/19  House Staff/Fellow note read, including vitals, physical findings, laboratory data, and radiological reports.   Revisions included below.   Direct personal management at bed side and extensive interpretation of the data.    Plan was outlined and discussed in details with the House Staff/Fellow.    Decision making of high complexity   Risk high of complications, morbidity, and/or mortality  Assessment and Action taken for acute disease activity to reflect the level of care provided:  -Hemodynamic evaluation and support  -ACS assessment and treatment as applicable  -Heart failure assessment and treatment as applicable  -Cardiac Telemetry reviewed  -Medication reconciliation  -Review laboratory data  -EKG reviewed   -Echo reviewed  -Interdisciplinary discussion with IC / EP / HF / CTS teams as needed  My plan includes :  close hemodynamic monitoring and management   Monitor for arrhythmias and monitor parameters for organ perfusion  monitor neurologic status  Head of the bed should remain elevated to 45 deg .   chest PT and IS will be encouraged  monitor adequacy of oxygenation and ventilation and attempt to wean oxygen  Nutritional goals will be met using po eventually , ensure adequate caloric intake and montior the same  Stress ulcer and VTE prophylaxis will be achieved    Glycemic control is satisfactory  Electrolytes have been replete as necessary and wound care has been carried out. Pain control has been achieved.   aggressive physical therapy and early mobility and ambulation goals will be met   The family was updated about the course and plan  CRITICAL CARE TIME SPENT in evaluation and management, reassessments, review and interpretation of labs and x-rays, ventilator and hemodynamic management, formulating a plan and coordinating care: ___90____ MIN.  Time does not include procedural time.    Carlie Ramirez MD  CCU ATTENDING  Mobile: [phone]

## 2019-03-23 NOTE — PROGRESS NOTE ADULT - PROBLEM SELECTOR PLAN 1
EKG NSR with worsening ST depressions in leads I, V4-V6, Troponin peaked at 0.15. Patient R/I NSTEMI, loaded with ASA/Brilinta and started on a Heparin gtt.    --cardiac cath 3/22/19 revealed normal LM, 100% LCx, patent stent in p/m/dLAD with 10% ISR, 90% ostial D1 lesion (small vessel), 40% mRCA, EF:60%, EDP 14mmHg.  Patient treated with successful MICHEAL x 2 to LCx.  -- continue ASA/Brilinta/Lipitor/Metoprolol Tartrate 12.5mg PO BID/Lisinopril 5mg PO daily.    Echo 3/23 revealed mild LVH, normal LV wall motion, EF:55-60%. Trace TR.

## 2019-03-23 NOTE — PROGRESS NOTE ADULT - ASSESSMENT
47 yo M w DM and CAD admitted w CP x1 day associated w n/v and diaphoresis admitted for NSTEMI s/p PCI w 2 MICHEAL to LCx.       NSTEMI (non-ST elevated myocardial infarction).  Plan: Patient with worsening and persistent chest pain since arrival to the ED, 10/10 and associated with nausea, vomiting, and diaphoresis that has persisted despite sublingual nitroglycerin. Briefly placed on nitro gtt but became bradycardic and nitro gtt was held. Repeat trops increased from 0.01 to 0.15 with worsening ST depressions in leads I, V4-V6. Patient s/p ASA and Brilinta load and started on heparin gtt  - s/p PCI w MICHEAL x 2 to LCx     Problem/Plan - 2:  ·  Problem: CAD (coronary atherosclerotic disease).  Plan: Patient with a history of CAD s/p 3 stents to LAD. 1 Stent was placed in 2017 and 2 more placed in 2018. Patient was on ASA and Plavix but Plavix was d/calixto 6 months ago per outside Cardiologist. Still on home ASA. Patient now presenting with NSTEMI as described above  -c/w plan as above.      Problem/Plan - 3:  ·  Problem: Diabetes.  Plan: Per Patient’s history. Patient is on Metformin 1000mg BID at home  -holding home PO meds while hospitalized  -f/u FSs  -ISS for now  -dose lantus and premeal insulin per ISS requirements.      Problem/Plan - 4:  ·  Problem: Nutrition, metabolism, and development symptoms.  Plan: F: No IVF  E: Replete PRN  N: NPO for cath  AC: Heparin gtt  FULL CODE  Dispo: CCU.      Problem/Plan - 5:  ·  Problem: Transition of care performed with sharing of clinical summary.  Plan: 1) PCP Contacted on Admission: (Y/N) --> Name & Phone #:  Georgi Garcia (Cardiologist): [phone]  2) Date of Contact with PCP: Will need contact in AM vs. when office is open   3) PCP Contacted at Discharge: (Y/N, N/A)  4) Summary of Handoff Given to PCP:   5) Post-Discharge Appointment Date and Location: [facility]. 49 yo M w DM and CAD admitted w CP x1 day associated w n/v and diaphoresis admitted for NSTEMI s/p PCI w 2 MICHEAL to LCx.       NSTEMI (non-ST elevated myocardial infarction).  Plan: Patient with worsening and persistent chest pain since arrival to the ED, 10/10 and associated with nausea, vomiting, and diaphoresis that has persisted despite sublingual nitroglycerin. Briefly placed on nitro gtt but became bradycardic and nitro gtt was held. Repeat trops increased from 0.01 to 0.15 with worsening ST depressions in leads I, V4-V6. Patient s/p ASA and Brilinta load and started on heparin gtt  - s/p PCI w MICHEAL x 2 to LCx  - c/w ASA/Brilinta  - c/w lipitor 80  - started lopressor 12.5 BID  - f/u echo  - consider starting ACEi pending how pt tolerates lopressor      CAD (coronary atherosclerotic disease).  Plan: Patient with a history of CAD s/p 3 stents to LAD. 1 Stent was placed in 2017 and 2 more placed in 2018. Patient was on ASA and Plavix but Plavix was d/calixto 6 months ago per outside Cardiologist. Still on home ASA. Patient now presenting with NSTEMI as described above  -c/w plan as above.       Diabetes.  Plan: Per Patient’s history. Patient is on Metformin 1000mg BID at home  -holding home PO meds while hospitalized  -f/u FSs  -ISS for now  -dose lantus and premeal insulin per ISS requirements.       Problem: Nutrition, metabolism, and development symptoms.  Plan: F: No IVF  E: Replete PRN  N: DASH/TLC/CC diet  AC: HSQ  FULL CODE  Dispo: CCU.     Transition of care performed with sharing of clinical summary.  Plan: 1) PCP Contacted on Admission: (Y/N) --> Name & Phone #:  Georgi Garcia (Cardiologist): [phone]  2) Date of Contact with PCP: Will need contact in AM vs. when office is open   3) PCP Contacted at Discharge: (Y/N, N/A)  4) Summary of Handoff Given to PCP:   5) Post-Discharge Appointment Date and Location: TBD.

## 2019-03-24 ENCOUNTER — TRANSCRIPTION ENCOUNTER (OUTPATIENT)
Age: 49
End: 2019-03-24

## 2019-03-24 ENCOUNTER — INBOUND DOCUMENT (OUTPATIENT)
Age: 49
End: 2019-03-24

## 2019-03-24 VITALS — TEMPERATURE: 98 F

## 2019-03-24 LAB
ANION GAP SERPL CALC-SCNC: 13 MMOL/L — SIGNIFICANT CHANGE UP (ref 5–17)
BUN SERPL-MCNC: 21 MG/DL — SIGNIFICANT CHANGE UP (ref 7–23)
CALCIUM SERPL-MCNC: 9.5 MG/DL — SIGNIFICANT CHANGE UP (ref 8.4–10.5)
CHLORIDE SERPL-SCNC: 102 MMOL/L — SIGNIFICANT CHANGE UP (ref 96–108)
CO2 SERPL-SCNC: 24 MMOL/L — SIGNIFICANT CHANGE UP (ref 22–31)
CREAT SERPL-MCNC: 0.77 MG/DL — SIGNIFICANT CHANGE UP (ref 0.5–1.3)
GLUCOSE BLDC GLUCOMTR-MCNC: 128 MG/DL — HIGH (ref 70–99)
GLUCOSE BLDC GLUCOMTR-MCNC: 139 MG/DL — HIGH (ref 70–99)
GLUCOSE BLDC GLUCOMTR-MCNC: 141 MG/DL — HIGH (ref 70–99)
GLUCOSE BLDC GLUCOMTR-MCNC: 181 MG/DL — HIGH (ref 70–99)
GLUCOSE SERPL-MCNC: 145 MG/DL — HIGH (ref 70–99)
HCT VFR BLD CALC: 41.2 % — SIGNIFICANT CHANGE UP (ref 39–50)
HGB BLD-MCNC: 13.5 G/DL — SIGNIFICANT CHANGE UP (ref 13–17)
MAGNESIUM SERPL-MCNC: 2 MG/DL — SIGNIFICANT CHANGE UP (ref 1.6–2.6)
MCHC RBC-ENTMCNC: 29.8 PG — SIGNIFICANT CHANGE UP (ref 27–34)
MCHC RBC-ENTMCNC: 32.8 GM/DL — SIGNIFICANT CHANGE UP (ref 32–36)
MCV RBC AUTO: 90.9 FL — SIGNIFICANT CHANGE UP (ref 80–100)
NRBC # BLD: 0 /100 WBCS — SIGNIFICANT CHANGE UP (ref 0–0)
PLATELET # BLD AUTO: 188 K/UL — SIGNIFICANT CHANGE UP (ref 150–400)
POTASSIUM SERPL-MCNC: 4.1 MMOL/L — SIGNIFICANT CHANGE UP (ref 3.5–5.3)
POTASSIUM SERPL-SCNC: 4.1 MMOL/L — SIGNIFICANT CHANGE UP (ref 3.5–5.3)
RBC # BLD: 4.53 M/UL — SIGNIFICANT CHANGE UP (ref 4.2–5.8)
RBC # FLD: 13.2 % — SIGNIFICANT CHANGE UP (ref 10.3–14.5)
SODIUM SERPL-SCNC: 139 MMOL/L — SIGNIFICANT CHANGE UP (ref 135–145)
T3FREE SERPL-MCNC: 2.72 PG/ML — SIGNIFICANT CHANGE UP (ref 1.71–3.71)
T4 FREE SERPL-MCNC: 0.97 NG/DL — SIGNIFICANT CHANGE UP (ref 0.7–1.48)
WBC # BLD: 9.27 K/UL — SIGNIFICANT CHANGE UP (ref 3.8–10.5)
WBC # FLD AUTO: 9.27 K/UL — SIGNIFICANT CHANGE UP (ref 3.8–10.5)

## 2019-03-24 PROCEDURE — 99232 SBSQ HOSP IP/OBS MODERATE 35: CPT | Mod: 25

## 2019-03-24 PROCEDURE — 99238 HOSP IP/OBS DSCHRG MGMT 30/<: CPT

## 2019-03-24 PROCEDURE — 71045 X-RAY EXAM CHEST 1 VIEW: CPT | Mod: 26

## 2019-03-24 RX ORDER — METOPROLOL TARTRATE 50 MG
0.5 TABLET ORAL
Qty: 30 | Refills: 2 | OUTPATIENT
Start: 2019-03-24 | End: 2019-06-21

## 2019-03-24 RX ORDER — METFORMIN HYDROCHLORIDE 850 MG/1
1 TABLET ORAL
Qty: 0 | Refills: 0 | COMMUNITY

## 2019-03-24 RX ORDER — METOPROLOL TARTRATE 50 MG
0.5 TABLET ORAL
Qty: 30 | Refills: 0 | OUTPATIENT
Start: 2019-03-24 | End: 2019-04-22

## 2019-03-24 RX ORDER — LISINOPRIL 2.5 MG/1
1 TABLET ORAL
Qty: 30 | Refills: 2 | OUTPATIENT
Start: 2019-03-24 | End: 2019-06-21

## 2019-03-24 RX ORDER — ATORVASTATIN CALCIUM 80 MG/1
1 TABLET, FILM COATED ORAL
Qty: 30 | Refills: 0 | OUTPATIENT
Start: 2019-03-24 | End: 2019-04-22

## 2019-03-24 RX ORDER — ASPIRIN/CALCIUM CARB/MAGNESIUM 324 MG
1 TABLET ORAL
Qty: 30 | Refills: 11 | OUTPATIENT
Start: 2019-03-24 | End: 2020-03-17

## 2019-03-24 RX ORDER — ASPIRIN/CALCIUM CARB/MAGNESIUM 324 MG
1 TABLET ORAL
Qty: 0 | Refills: 0 | COMMUNITY

## 2019-03-24 RX ORDER — TICAGRELOR 90 MG/1
1 TABLET ORAL
Qty: 60 | Refills: 10 | OUTPATIENT
Start: 2019-03-24 | End: 2020-02-16

## 2019-03-24 RX ORDER — TICAGRELOR 90 MG/1
1 TABLET ORAL
Qty: 60 | Refills: 0 | OUTPATIENT
Start: 2019-03-24 | End: 2019-04-22

## 2019-03-24 RX ADMIN — Medication 81 MILLIGRAM(S): at 11:58

## 2019-03-24 RX ADMIN — Medication 12.5 MILLIGRAM(S): at 18:30

## 2019-03-24 RX ADMIN — Medication 12.5 MILLIGRAM(S): at 05:31

## 2019-03-24 RX ADMIN — HEPARIN SODIUM 5000 UNIT(S): 5000 INJECTION INTRAVENOUS; SUBCUTANEOUS at 15:30

## 2019-03-24 RX ADMIN — TICAGRELOR 90 MILLIGRAM(S): 90 TABLET ORAL at 18:30

## 2019-03-24 RX ADMIN — Medication 0: at 21:35

## 2019-03-24 RX ADMIN — LISINOPRIL 5 MILLIGRAM(S): 2.5 TABLET ORAL at 05:30

## 2019-03-24 RX ADMIN — ATORVASTATIN CALCIUM 80 MILLIGRAM(S): 80 TABLET, FILM COATED ORAL at 21:34

## 2019-03-24 RX ADMIN — TICAGRELOR 90 MILLIGRAM(S): 90 TABLET ORAL at 05:30

## 2019-03-24 RX ADMIN — HEPARIN SODIUM 5000 UNIT(S): 5000 INJECTION INTRAVENOUS; SUBCUTANEOUS at 05:30

## 2019-03-24 RX ADMIN — Medication 2: at 11:02

## 2019-03-24 RX ADMIN — HEPARIN SODIUM 5000 UNIT(S): 5000 INJECTION INTRAVENOUS; SUBCUTANEOUS at 21:35

## 2019-03-24 NOTE — DISCHARGE NOTE NURSING/CASE MANAGEMENT/SOCIAL WORK - NSDCDPATPORTLINK_GEN_ALL_CORE
You can access the [facility] Patient Portal, offered by [facility], by registering with the following website: [email or web address]

## 2019-03-24 NOTE — DISCHARGE NOTE PROVIDER - NSDCCPCAREPLAN_GEN_ALL_CORE_FT
PRINCIPAL DISCHARGE DIAGNOSIS  Diagnosis: NSTEMI (non-ST elevated myocardial infarction)  Assessment and Plan of Treatment: CONTINUE ASPIRIN DAILY AND BRILINTA (TICAGRELOR) TWICE DAILY. Continue current listed medical regimen. See Dr. Ramirez and Primary Doctor this week    Monitor groin/leg for swelling, bleeding, discharge, pain or skin discoloration if any of these findings are noted go to nearest ER, seek medical attention immediately and call [phone] if any questions. If chest pain, shortness of breath, dizziness noted call MD immediately and seek medical attention.  Avoid hot tubs, swimming pools and baths for 1 week. You are not cleared to exercise or return to work until clearance by your cardiologist. Avoid lifting more than 3 pounds for one week, avoid exertional movements such intimacy, running, jumping.   You have been given a free month supply of Brilinta on discharge. The remaining 10 months of Brilinta were sent to your pharmacy.         SECONDARY DISCHARGE DIAGNOSES  Diagnosis: Diabetes  Assessment and Plan of Treatment: HOLD METFORMIN AND RESUME 3/25/19. Check fingerstick three times daily before meals and at bedtime. If fingerstick greater than 200 or less than 80 call MD for further instructions. Avoid concentrated sweets. Follow diabetic diet. MD follow-up. For low TSH and normal Free T3 and Free T4 lab blood tests, see Primary Doctor this week PRINCIPAL DISCHARGE DIAGNOSIS  Diagnosis: NSTEMI (non-ST elevated myocardial infarction)  Assessment and Plan of Treatment: CONTINUE ASPIRIN DAILY AND BRILINTA (TICAGRELOR) TWICE DAILY. Continue current listed medical regimen. See Dr. Ramirez and Primary Doctor this week    Monitor groin/leg for swelling, bleeding, discharge, pain or skin discoloration if any of these findings are noted go to nearest ER, seek medical attention immediately and call [phone] if any questions. If chest pain, shortness of breath, dizziness noted call MD immediately and seek medical attention.  Avoid hot tubs, swimming pools and baths for 1 week. You are not cleared to exercise or return to work until clearance by your cardiologist. Avoid lifting more than 3 pounds for one week, avoid exertional movements such intimacy, running, jumping.   You have been given a free month supply of Brilinta on discharge. The remaining 11 months of Brilinta were sent to your pharmacy.         SECONDARY DISCHARGE DIAGNOSES  Diagnosis: Diabetes  Assessment and Plan of Treatment: HOLD METFORMIN AND RESUME 3/25/19. Check fingerstick three times daily before meals and at bedtime. If fingerstick greater than 200 or less than 80 call MD for further instructions. Avoid concentrated sweets. Follow diabetic diet. MD follow-up. For low TSH and normal Free T3 and Free T4 lab blood tests, see Primary Doctor this week

## 2019-03-24 NOTE — DISCHARGE NOTE PROVIDER - HOSPITAL COURSE
49 y/o M w/ PMHX DM, CAD who presented w/ NSTEMI, s/p cardiac cath 3/22/19% MICHEAL x 2LCX, patent stent in p/m/dLAD with 10% ISR, 90% D1 (small vessel, jailed by LA stent), 40% mRCA, EF: 60%, EDP 14mmHg, admitted to CCU and stepped down to 5 Uris        NSTEMI     S/p PCI as above    Symptom free     Continue ASA 81mg daily    Continue Brilinta 90mg BID    Continue Lipitor 80mg QHS    Continue Lopressor 12.5mg BID    Continue Lisinopril 5mg daily    TTE showed EF 55-60%    Groin stable          TSH 0.26. Free T3/T4 normal. Outpatient follow-up per MD        Diabetes    Hold Metformin and resume on 3/25/19        Stable for D/C home as per Dr. Ramirez. See Dr. Ramirez this week. See Primary Doctor this week.        Pt seen and examined bedside.    Patient denies C/P, SOB, N/V, dizziness, palpitations, and diaphoresis.    Pt denies fever/chills, dysuria, abdominal pain, diarrhea, and cough    	    	    GEN: NAD    PULM:  CTA B/L    CARD: No JVD B/L, RRR, S1 and S2     ABD: +BS, NT, soft/ND	    EXT: No Edema B/L LE, Distal Pulses Intact and at Baseline     R GROIN: soft, no hematoma, no bleeding, no femoral bruit    NEURO: A+Ox3, no focal deficit                                13.5     9.27  )-----------( 188      ( 24 Mar 2019 07:36 )               41.2         03-24        139  |  102  |  21    ----------------------------<  145<H>    4.1   |  24  |  0.77        Ca    9.5      24 Mar 2019 07:36    Phos  3.7     03-23    Mg     2.0     03-24

## 2019-03-24 NOTE — DISCHARGE NOTE NURSING/CASE MANAGEMENT/SOCIAL WORK - NSDCPEEMAIL_GEN_ALL_CORE
[facility] for Tobacco Control email [email or web address]

## 2019-03-24 NOTE — DISCHARGE NOTE PROVIDER - CARE PROVIDER_API CALL
Carlie Ramirez)  Internal Medicine  [address] [place] [id number]  Phone: [phone]  Fax: [phone]  Follow Up Time:

## 2019-03-24 NOTE — DISCHARGE NOTE NURSING/CASE MANAGEMENT/SOCIAL WORK - NSDCPEWEB_GEN_ALL_CORE
[place] website --- [email or web address] Tobacco [email or web address] website --- [email or web address]

## 2019-03-26 PROBLEM — E11.9 TYPE 2 DIABETES MELLITUS WITHOUT COMPLICATIONS: Chronic | Status: ACTIVE | Noted: 2019-03-22

## 2019-03-26 PROBLEM — Z00.00 ENCOUNTER FOR PREVENTIVE HEALTH EXAMINATION: Status: ACTIVE | Noted: 2019-03-26

## 2019-03-26 PROBLEM — I25.10 ATHEROSCLEROTIC HEART DISEASE OF NATIVE CORONARY ARTERY WITHOUT ANGINA PECTORIS: Chronic | Status: ACTIVE | Noted: 2019-03-22

## 2019-03-26 PROBLEM — I21.9 ACUTE MYOCARDIAL INFARCTION, UNSPECIFIED: Chronic | Status: ACTIVE | Noted: 2019-03-22

## 2019-03-28 ENCOUNTER — APPOINTMENT (OUTPATIENT)
Dept: HEART AND VASCULAR | Facility: CLINIC | Age: 49
End: 2019-03-28
Payer: COMMERCIAL

## 2019-03-28 VITALS
WEIGHT: 211 LBS | HEIGHT: 74.02 IN | TEMPERATURE: 98.8 F | OXYGEN SATURATION: 99 % | HEART RATE: 94 BPM | SYSTOLIC BLOOD PRESSURE: 136 MMHG | BODY MASS INDEX: 27.08 KG/M2 | DIASTOLIC BLOOD PRESSURE: 86 MMHG

## 2019-03-28 DIAGNOSIS — F17.210 NICOTINE DEPENDENCE, CIGARETTES, UNCOMPLICATED: ICD-10-CM

## 2019-03-28 DIAGNOSIS — Z79.82 LONG TERM (CURRENT) USE OF ASPIRIN: ICD-10-CM

## 2019-03-28 DIAGNOSIS — Z95.5 PRESENCE OF CORONARY ANGIOPLASTY IMPLANT AND GRAFT: ICD-10-CM

## 2019-03-28 DIAGNOSIS — I25.110 ATHEROSCLEROTIC HEART DISEASE OF NATIVE CORONARY ARTERY WITH UNSTABLE ANGINA PECTORIS: ICD-10-CM

## 2019-03-28 DIAGNOSIS — I21.4 NON-ST ELEVATION (NSTEMI) MYOCARDIAL INFARCTION: ICD-10-CM

## 2019-03-28 DIAGNOSIS — I25.2 OLD MYOCARDIAL INFARCTION: ICD-10-CM

## 2019-03-28 DIAGNOSIS — E11.9 TYPE 2 DIABETES MELLITUS WITHOUT COMPLICATIONS: ICD-10-CM

## 2019-03-28 DIAGNOSIS — Z79.84 LONG TERM (CURRENT) USE OF ORAL HYPOGLYCEMIC DRUGS: ICD-10-CM

## 2019-03-28 PROCEDURE — 99401 PREV MED CNSL INDIV APPRX 15: CPT

## 2019-03-28 PROCEDURE — 96161 CAREGIVER HEALTH RISK ASSMT: CPT

## 2019-03-28 PROCEDURE — 99215 OFFICE O/P EST HI 40 MIN: CPT | Mod: 25

## 2019-03-28 PROCEDURE — 99358 PROLONG SERVICE W/O CONTACT: CPT

## 2019-03-28 PROCEDURE — 93000 ELECTROCARDIOGRAM COMPLETE: CPT | Mod: 59

## 2019-03-28 NOTE — REASON FOR VISIT
[Follow-Up - From Hospitalization] : follow-up of a recent hospitalization for [Coronary Artery Disease] : coronary artery disease [Discharge Date: ___] : Discharge Date: [unfilled]

## 2019-03-28 NOTE — HISTORY OF PRESENT ILLNESS
[FreeTextEntry1] : 47 y/o M w/ PMHX DM, CAD who presented w/ NSTEMI, s/p cardiac cath 3/22/19% MICHEAL \par x 2LCX, patent stent in p/m/dLAD with 10% ISR, 90% D1 (small vessel, jailed by \par LA stent), 40% mRCA, EF: 60%, EDP 14mmHg,

## 2019-03-28 NOTE — PHYSICAL EXAM
[General Appearance - Well Developed] : well developed [Normal Appearance] : normal appearance [Well Groomed] : well groomed [General Appearance - Well Nourished] : well nourished [No Deformities] : no deformities [General Appearance - In No Acute Distress] : no acute distress [Normal Conjunctiva] : the conjunctiva exhibited no abnormalities [Eyelids - No Xanthelasma] : the eyelids demonstrated no xanthelasmas [Normal Oral Mucosa] : normal oral mucosa [No Oral Pallor] : no oral pallor [No Oral Cyanosis] : no oral cyanosis [Normal Jugular Venous A Waves Present] : normal jugular venous A waves present [Normal Jugular Venous V Waves Present] : normal jugular venous V waves present [No Jugular Venous Liz A Waves] : no jugular venous liz A waves [Respiration, Rhythm And Depth] : normal respiratory rhythm and effort [Exaggerated Use Of Accessory Muscles For Inspiration] : no accessory muscle use [Auscultation Breath Sounds / Voice Sounds] : lungs were clear to auscultation bilaterally [Heart Rate And Rhythm] : heart rate and rhythm were normal [Heart Sounds] : normal S1 and S2 [Murmurs] : no murmurs present [Abdomen Soft] : soft [Abdomen Tenderness] : non-tender [Abdomen Mass (___ Cm)] : no abdominal mass palpated [Abnormal Walk] : normal gait [Gait - Sufficient For Exercise Testing] : the gait was sufficient for exercise testing [Nail Clubbing] : no clubbing of the fingernails [Cyanosis, Localized] : no localized cyanosis [Petechial Hemorrhages (___cm)] : no petechial hemorrhages [Skin Color & Pigmentation] : normal skin color and pigmentation [] : no rash [No Venous Stasis] : no venous stasis [Skin Lesions] : no skin lesions [No Skin Ulcers] : no skin ulcer [No Xanthoma] : no  xanthoma was observed [Oriented To Time, Place, And Person] : oriented to person, place, and time [Affect] : the affect was normal [Mood] : the mood was normal [No Anxiety] : not feeling anxious

## 2019-03-28 NOTE — DISCUSSION/SUMMARY
[FreeTextEntry1] : The number of diagnostic and/or management options \par 47 y/o M w/ PMHX DM, CAD who presented w/ NSTEMI, s/p cardiac cath 3/22/19% MICHEAL \par x 2LCX, patent stent in p/m/dLAD with 10% ISR, 90% D1 (small vessel, jailed by \par LA stent), 40% mRCA, EF: 60%, EDP 14mmHg,\par \par GDMT OM DAPT\par no CP\par site c/d/i\par EF preserved\par pcp dr ga given\par \par Labs, radiology: ekg echo hospital records\par \par Aspirin therapy: yes\par \par LDL: statin\par \par Overall Risk: High Complexity\par \par 34 min review of recent hospital inpatient admission, progress and discharge records prior to patients visit to assist in this exam. \par \par Administration of PHQ-2/9 for the benefit of the patient\par Score = 0\par Rx = Reassurance provided\par  \par Prevention counseling 17min\par Patient was competent and alert at the time of the counseling provided. Will reinforce subsequent visit.\par ·	The patient’s blood pressure goal SBP<130mmHg\par ·	Advised Mediterranean diet discussion, No salt diet - including increased CAD PAD and mortality \par ·	Assessed willingness to attempt - contemplation stage\par ·	Providing methods and skills for prevention - prevention medicine referral, nutritionist, cbt therapy\par ·	Medication management - n/a\par ·	Resources provided - prevention medicine referral, nutritionist, cbt therapy, group counseling, individual counseling, cbt therapy\par ·	Setting goals - not ready to commit to a date              \par ·	Follow-up arranged - next scheduled visit\par \par

## 2019-04-10 PROCEDURE — 86901 BLOOD TYPING SEROLOGIC RH(D): CPT

## 2019-04-10 PROCEDURE — 83735 ASSAY OF MAGNESIUM: CPT

## 2019-04-10 PROCEDURE — 84481 FREE ASSAY (FT-3): CPT

## 2019-04-10 PROCEDURE — C1894: CPT

## 2019-04-10 PROCEDURE — 36415 COLL VENOUS BLD VENIPUNCTURE: CPT

## 2019-04-10 PROCEDURE — 85610 PROTHROMBIN TIME: CPT

## 2019-04-10 PROCEDURE — C1887: CPT

## 2019-04-10 PROCEDURE — 84439 ASSAY OF FREE THYROXINE: CPT

## 2019-04-10 PROCEDURE — 84484 ASSAY OF TROPONIN QUANT: CPT

## 2019-04-10 PROCEDURE — 84100 ASSAY OF PHOSPHORUS: CPT

## 2019-04-10 PROCEDURE — 96374 THER/PROPH/DIAG INJ IV PUSH: CPT

## 2019-04-10 PROCEDURE — 86850 RBC ANTIBODY SCREEN: CPT

## 2019-04-10 PROCEDURE — 71045 X-RAY EXAM CHEST 1 VIEW: CPT

## 2019-04-10 PROCEDURE — 93005 ELECTROCARDIOGRAM TRACING: CPT

## 2019-04-10 PROCEDURE — 85027 COMPLETE CBC AUTOMATED: CPT

## 2019-04-10 PROCEDURE — 86900 BLOOD TYPING SEROLOGIC ABO: CPT

## 2019-04-10 PROCEDURE — 80061 LIPID PANEL: CPT

## 2019-04-10 PROCEDURE — 93306 TTE W/DOPPLER COMPLETE: CPT

## 2019-04-10 PROCEDURE — 85730 THROMBOPLASTIN TIME PARTIAL: CPT

## 2019-04-10 PROCEDURE — 80307 DRUG TEST PRSMV CHEM ANLYZR: CPT

## 2019-04-10 PROCEDURE — C1889: CPT

## 2019-04-10 PROCEDURE — C1760: CPT

## 2019-04-10 PROCEDURE — 82550 ASSAY OF CK (CPK): CPT

## 2019-04-10 PROCEDURE — 83036 HEMOGLOBIN GLYCOSYLATED A1C: CPT

## 2019-04-10 PROCEDURE — 80048 BASIC METABOLIC PNL TOTAL CA: CPT

## 2019-04-10 PROCEDURE — 96375 TX/PRO/DX INJ NEW DRUG ADDON: CPT

## 2019-04-10 PROCEDURE — C1769: CPT

## 2019-04-10 PROCEDURE — 80053 COMPREHEN METABOLIC PANEL: CPT

## 2019-04-10 PROCEDURE — 82553 CREATINE MB FRACTION: CPT

## 2019-04-10 PROCEDURE — C1874: CPT

## 2019-04-10 PROCEDURE — 99291 CRITICAL CARE FIRST HOUR: CPT | Mod: 25

## 2019-04-10 PROCEDURE — 84443 ASSAY THYROID STIM HORMONE: CPT

## 2019-04-10 PROCEDURE — C1725: CPT

## 2019-04-10 PROCEDURE — 82962 GLUCOSE BLOOD TEST: CPT

## 2019-06-27 ENCOUNTER — APPOINTMENT (OUTPATIENT)
Dept: HEART AND VASCULAR | Facility: CLINIC | Age: 49
End: 2019-06-27

## 2021-12-28 ENCOUNTER — TRANSCRIPTION ENCOUNTER (OUTPATIENT)
Age: 51
End: 2021-12-28

## 2022-01-20 ENCOUNTER — APPOINTMENT (OUTPATIENT)
Dept: ORTHOPEDIC SURGERY | Facility: CLINIC | Age: 52
End: 2022-01-20
Payer: COMMERCIAL

## 2022-01-20 VITALS — WEIGHT: 220 LBS | BODY MASS INDEX: 27.35 KG/M2 | HEIGHT: 75 IN

## 2022-01-20 DIAGNOSIS — M54.2 CERVICALGIA: ICD-10-CM

## 2022-01-20 DIAGNOSIS — M25.512 PAIN IN LEFT SHOULDER: ICD-10-CM

## 2022-01-20 DIAGNOSIS — M89.8X1 OTHER SPECIFIED DISORDERS OF BONE, SHOULDER: ICD-10-CM

## 2022-01-20 PROCEDURE — 72040 X-RAY EXAM NECK SPINE 2-3 VW: CPT

## 2022-01-20 PROCEDURE — 73030 X-RAY EXAM OF SHOULDER: CPT | Mod: LT

## 2022-01-20 PROCEDURE — 99203 OFFICE O/P NEW LOW 30 MIN: CPT

## 2022-01-20 NOTE — ASSESSMENT
[FreeTextEntry1] : Discussed at length with patient exam history and imaging as well as treatment options.  Recommendation at this time for MRI evaluation left shoulder given symptoms and particularly weakness.  Patient agrees with plan

## 2022-01-20 NOTE — PHYSICAL EXAM
[de-identified] : Left Shoulder:\par Constitutional:\par The patient is healthy-appearing and in no apparent distress. \par \par Cardiovascular System: \par The capillary refill is less than 2 seconds. \par \par Skin: \par There are no skin abnormalities.\par \par C-Spine/Neck:\par \par Active Range of Motion:\par Flexion				50\par Extension			60\par Lateral rotation			80  \par \par Left Shoulder: \par Inspection: \par There is no atrophy, erythema, warmth, swelling.\par There is no scapular winging.\par There is no AC prominence. \par \par Bony Palpation: \par There is no tenderness of the clavicle.\par There is no tenderness of the acromioclavicular joint.\par There is tenderness of the greater tuberosity. \par There is no tenderness of the bicipital groove.\par  \par Soft Tissue Palpation: \par There is no tenderness of the trapezius.\par There is no tenderness of the rhomboid.\par There is tenderness of the subacromial bursa. \par \par Active Range of Motion: \par Forward flexion- 				160 \par Abduction-					80\par External rotation at 0 degrees abduction-	60 \par Internal rotation at 0 degrees abduction-	80\par \par Passive Range of Motion: \par Forward flexion- 			170 \par Abduction-				100\par External rotation at 0 deg abduction-	70 \par Internal rotation at 0 deg abduction-	80\par \par Strength:\par Supraspinatus / Abduction                  4/5\par External rotation                                 4/5\par Internal rotation                                  5/5\par \par Special Tests: \par Hawkin's  				Positive \par Neer's  				Positive \par Speed's  				Negative\par AC cross-over 			            Negative\par [place]'s  				Negative\par \par Neurological System: \par \par There is normal sensation to light touch C5-T1. \par \par Stability: \par There is no general laxity. \par \par Psychiatric: \par The patient demonstrates a normal mood and affect and is active and alert\par  [de-identified] : Given patient's reported history and physical examination, x-ray evaluation ( as listed below ) was ordered and performed to aid in diagnosis and treatment of the patient.\par X-ray left shoulder.  There is no significant bony / soft tissue abnormality, arthritis, or fracture.\par X-rays cervical spine.  There is minimal midcervical arthritis with mild loss of lordosis.

## 2022-01-20 NOTE — HISTORY OF PRESENT ILLNESS
[de-identified] : Location: Left shoulder- lateral\par Duration: 2-3 months\par Context: atraumatic\par Quality: sharp\par Aggravating factors: ROM, sleep\par Associated symptoms: neck pain, clicking\par Conservative treatment: Tylenol, Advil, icy hot\par Prior studies: N/A

## 2022-01-21 ENCOUNTER — TRANSCRIPTION ENCOUNTER (OUTPATIENT)
Age: 52
End: 2022-01-21

## 2022-02-01 ENCOUNTER — APPOINTMENT (OUTPATIENT)
Dept: ORTHOPEDIC SURGERY | Facility: CLINIC | Age: 52
End: 2022-02-01
Payer: COMMERCIAL

## 2022-02-01 DIAGNOSIS — M75.50 BURSITIS OF UNSPECIFIED SHOULDER: ICD-10-CM

## 2022-02-01 PROCEDURE — 99213 OFFICE O/P EST LOW 20 MIN: CPT | Mod: 25

## 2022-02-01 PROCEDURE — 20610 DRAIN/INJ JOINT/BURSA W/O US: CPT | Mod: LT

## 2022-02-01 NOTE — PHYSICAL EXAM
[de-identified] : Left Shoulder:\par \par Constitutional:\par The patient is healthy-appearing and in no apparent distress. \par \par Cardiovascular System: \par The capillary refill is less than 2 seconds. \par \par Skin: \par There are no skin abnormalities.\par \par Left Shoulder: \par Inspection: \par There is no atrophy, erythema, warmth, swelling.\par There is no scapular winging.\par There is no AC prominence. \par \par Bony Palpation: \par There is no tenderness of the clavicle.\par There is no tenderness of the acromioclavicular joint.\par There is tenderness of the greater tuberosity. \par There is no tenderness of the bicipital groove.\par  \par Soft Tissue Palpation: \par There is no tenderness of the trapezius.\par There is no tenderness of the rhomboid.\par There is tenderness of the subacromial bursa. \par \par Active Range of Motion: \par Forward flexion- 				160 \par Abduction-					80\par External rotation at 0 degrees abduction-	60 \par Internal rotation at 0 degrees abduction-	80\par \par Passive Range of Motion: \par Forward flexion- 			170 \par Abduction-				100\par External rotation at 0 deg abduction-	70 \par Internal rotation at 0 deg abduction-	80\par \par Strength:\par Supraspinatus / Abduction                  4+/5\par External rotation                                 5/5\par Internal rotation                                  5/5\par \par Special Tests: \par Hawkin's  				Positive \par Neer's  				Positive \par \par Neurological System: \par \par There is normal sensation to light touch C5-T1. \par \par Stability: \par There is no general laxity. \par \par Psychiatric: \par The patient demonstrates a normal mood and affect and is active and alert\par  [de-identified] : MRI of left shoulder.  There is rotator cuff tendinitis and bursitis

## 2022-02-01 NOTE — PROCEDURE
[de-identified] : Patient has demonstrated limited relief from NSAIDS, rest, exercises / PT, and after discussion of the risks and benefits, the patient has elected to proceed with a corticosteroid injection into the LEFT shoulder via Posterolateral site.\par Confirmed that the patient does not have history of prior adverse reactions, active, infections, or relevant allergies.   There was no erythema or warmth, and the skin was clear.  The skin was sterilized with alcohol and via sterile technique, the shoulder was injected with 3 cc of 1% xylocaine and 40 mg of Kenalog.  The injection was completed without complication and a bandage was applied.  The patient tolerated the procedure well and was given post-injection instructions.

## 2022-02-01 NOTE — HISTORY OF PRESENT ILLNESS
[de-identified] : Patient is an established patient presenting for further evaluation and treatment discussion regards to left shoulder pain.  He had an MRI which revealed tendinitis and bursitis but no evidence of a clear rotator cuff tear.  He states persistent discomfort despite home exercises and activity modification

## 2022-02-01 NOTE — ASSESSMENT
[FreeTextEntry1] : Discussed at length with patient exam treatment options an MRI reviewed again.  At this time elects cortisone injection and encouraged to continue physical therapy and home exercises.  In 3-4 weeks patient to follow up in office and informed ultimately if therapy nonoperative treatment he may require an arthroscopy.  Patient agrees with plan

## 2023-10-12 ENCOUNTER — NON-APPOINTMENT (OUTPATIENT)
Age: 53
End: 2023-10-12

## 2024-02-11 ENCOUNTER — NON-APPOINTMENT (OUTPATIENT)
Age: 54
End: 2024-02-11

## 2024-02-20 NOTE — PATIENT PROFILE ADULT - NSTOBACCOCESSATIONEDU3_GEN_A_NUR
Statement Selected
Learning behavioral activities to cope with urges.  For example, distraction and changing routines

## 2024-02-28 ENCOUNTER — NON-APPOINTMENT (OUTPATIENT)
Age: 54
End: 2024-02-28

## 2024-04-15 ENCOUNTER — NON-APPOINTMENT (OUTPATIENT)
Age: 54
End: 2024-04-15

## 2024-04-26 ENCOUNTER — APPOINTMENT (OUTPATIENT)
Dept: ORTHOPEDIC SURGERY | Facility: CLINIC | Age: 54
End: 2024-04-26
Payer: COMMERCIAL

## 2024-04-26 VITALS — HEIGHT: 75 IN | WEIGHT: 212 LBS | BODY MASS INDEX: 26.36 KG/M2

## 2024-04-26 DIAGNOSIS — Z80.9 FAMILY HISTORY OF MALIGNANT NEOPLASM, UNSPECIFIED: ICD-10-CM

## 2024-04-26 DIAGNOSIS — F17.200 NICOTINE DEPENDENCE, UNSPECIFIED, UNCOMPLICATED: ICD-10-CM

## 2024-04-26 DIAGNOSIS — Z86.39 PERSONAL HISTORY OF OTHER ENDOCRINE, NUTRITIONAL AND METABOLIC DISEASE: ICD-10-CM

## 2024-04-26 DIAGNOSIS — Z86.79 PERSONAL HISTORY OF OTHER DISEASES OF THE CIRCULATORY SYSTEM: ICD-10-CM

## 2024-04-26 DIAGNOSIS — M77.01 MEDIAL EPICONDYLITIS, RIGHT ELBOW: ICD-10-CM

## 2024-04-26 PROCEDURE — 99214 OFFICE O/P EST MOD 30 MIN: CPT | Mod: 25

## 2024-04-26 PROCEDURE — 20605 DRAIN/INJ JOINT/BURSA W/O US: CPT | Mod: RT

## 2024-04-26 PROCEDURE — 99204 OFFICE O/P NEW MOD 45 MIN: CPT | Mod: 25

## 2024-04-26 RX ORDER — NABUMETONE 500 MG/1
500 TABLET, FILM COATED ORAL
Qty: 30 | Refills: 2 | Status: DISCONTINUED | COMMUNITY
Start: 2022-01-20 | End: 2024-04-26

## 2024-04-26 RX ORDER — METAXALONE 800 MG/1
800 TABLET ORAL 3 TIMES DAILY
Qty: 30 | Refills: 0 | Status: DISCONTINUED | COMMUNITY
Start: 2022-01-20 | End: 2024-04-26

## 2024-04-26 RX ORDER — MELOXICAM 15 MG/1
15 TABLET ORAL
Qty: 30 | Refills: 1 | Status: ACTIVE | COMMUNITY
Start: 2024-04-26 | End: 1900-01-01

## 2024-04-26 RX ORDER — INSULIN GLARGINE 100 [IU]/ML
100 INJECTION, SOLUTION SUBCUTANEOUS
Refills: 0 | Status: ACTIVE | COMMUNITY

## 2024-04-26 RX ORDER — METFORMIN HYDROCHLORIDE 625 MG/1
TABLET ORAL
Refills: 0 | Status: ACTIVE | COMMUNITY

## 2024-07-25 ENCOUNTER — APPOINTMENT (OUTPATIENT)
Dept: ENDOCRINOLOGY | Facility: CLINIC | Age: 54
End: 2024-07-25
Payer: COMMERCIAL

## 2024-07-25 VITALS
BODY MASS INDEX: 26 KG/M2 | WEIGHT: 208 LBS | SYSTOLIC BLOOD PRESSURE: 147 MMHG | DIASTOLIC BLOOD PRESSURE: 99 MMHG | HEART RATE: 77 BPM

## 2024-07-25 PROCEDURE — 99205 OFFICE O/P NEW HI 60 MIN: CPT

## 2024-07-25 PROCEDURE — 83036 HEMOGLOBIN GLYCOSYLATED A1C: CPT | Mod: QW

## 2024-07-25 PROCEDURE — G2211 COMPLEX E/M VISIT ADD ON: CPT | Mod: NC

## 2024-07-25 PROCEDURE — 36415 COLL VENOUS BLD VENIPUNCTURE: CPT

## 2024-07-25 PROCEDURE — 82962 GLUCOSE BLOOD TEST: CPT

## 2024-07-25 RX ORDER — FLASH GLUCOSE SENSOR
KIT MISCELLANEOUS
Qty: 2 | Refills: 0 | Status: ACTIVE | COMMUNITY
Start: 2024-07-25 | End: 1900-01-01

## 2024-07-25 RX ORDER — FLASH GLUCOSE SCANNING READER
EACH MISCELLANEOUS
Qty: 1 | Refills: 0 | Status: ACTIVE | COMMUNITY
Start: 2024-07-25 | End: 1900-01-01

## 2024-07-25 NOTE — REVIEW OF SYSTEMS
[Blurred Vision] : blurred vision [Nocturia] : nocturia [Muscle Cramps] : muscle cramps [Negative] : Heme/Lymph

## 2024-07-25 NOTE — PHYSICAL EXAM
[No Acute Distress] : no acute distress [Normal Sclera/Conjunctiva] : normal sclera/conjunctiva [Normal Outer Ear/Nose] : the ears and nose were normal in appearance [Thyroid Not Enlarged] : the thyroid was not enlarged [No Thyroid Nodules] : no palpable thyroid nodules [Clear to Auscultation] : lungs were clear to auscultation bilaterally [Normal Rate] : heart rate was normal [No Edema] : no peripheral edema [Soft] : abdomen soft [Spine Straight] : spine straight [No Stigmata of Cushings Syndrome] : no stigmata of Cushings Syndrome [Normal Gait] : normal gait [Normal Strength/Tone] : muscle strength and tone were normal [No Rash] : no rash [Normal Reflexes] : deep tendon reflexes were 2+ and symmetric [No Tremors] : no tremors [Oriented x3] : oriented to person, place, and time

## 2024-07-29 LAB
ALBUMIN SERPL ELPH-MCNC: 4.5 G/DL
ALP BLD-CCNC: 98 U/L
ALT SERPL-CCNC: 16 U/L
ANION GAP SERPL CALC-SCNC: 14 MMOL/L
AST SERPL-CCNC: 16 U/L
BILIRUB SERPL-MCNC: 0.3 MG/DL
BUN SERPL-MCNC: 15 MG/DL
C PEPTIDE SERPL-MCNC: 4.4 NG/ML
CALCIUM SERPL-MCNC: 9.8 MG/DL
CHLORIDE SERPL-SCNC: 100 MMOL/L
CHOLEST SERPL-MCNC: 196 MG/DL
CO2 SERPL-SCNC: 20 MMOL/L
CREAT SERPL-MCNC: 0.76 MG/DL
CREAT SPEC-SCNC: 233 MG/DL
EGFR: 107 ML/MIN/1.73M2
GLUCOSE BLDC GLUCOMTR-MCNC: 235
GLUCOSE SERPL-MCNC: 230 MG/DL
HBA1C MFR BLD HPLC: 9
HCT VFR BLD CALC: 49.4 %
HDLC SERPL-MCNC: 34 MG/DL
HGB BLD-MCNC: 15.6 G/DL
LDLC SERPL CALC-MCNC: 135 MG/DL
MCHC RBC-ENTMCNC: 30 PG
MCHC RBC-ENTMCNC: 31.6 GM/DL
MCV RBC AUTO: 95 FL
MICROALBUMIN 24H UR DL<=1MG/L-MCNC: 3.6 MG/DL
MICROALBUMIN/CREAT 24H UR-RTO: 15 MG/G
NONHDLC SERPL-MCNC: 162 MG/DL
PLATELET # BLD AUTO: 230 K/UL
POTASSIUM SERPL-SCNC: 4.7 MMOL/L
PROT SERPL-MCNC: 6.9 G/DL
RBC # BLD: 5.2 M/UL
RBC # FLD: 14 %
SODIUM SERPL-SCNC: 134 MMOL/L
TRIGL SERPL-MCNC: 154 MG/DL
TSH SERPL-ACNC: 0.78 UIU/ML
WBC # FLD AUTO: 7.31 K/UL

## 2024-07-31 ENCOUNTER — APPOINTMENT (OUTPATIENT)
Dept: ENDOCRINOLOGY | Facility: CLINIC | Age: 54
End: 2024-07-31
Payer: COMMERCIAL

## 2024-07-31 VITALS
DIASTOLIC BLOOD PRESSURE: 92 MMHG | HEART RATE: 77 BPM | BODY MASS INDEX: 25.75 KG/M2 | SYSTOLIC BLOOD PRESSURE: 154 MMHG | WEIGHT: 206 LBS

## 2024-07-31 DIAGNOSIS — E11.65 TYPE 2 DIABETES MELLITUS WITH HYPERGLYCEMIA: ICD-10-CM

## 2024-07-31 PROCEDURE — 82962 GLUCOSE BLOOD TEST: CPT

## 2024-07-31 PROCEDURE — 99214 OFFICE O/P EST MOD 30 MIN: CPT | Mod: 25

## 2024-07-31 RX ORDER — SEMAGLUTIDE 0.68 MG/ML
2 INJECTION, SOLUTION SUBCUTANEOUS
Qty: 1 | Refills: 2 | Status: ACTIVE | COMMUNITY
Start: 2024-07-31 | End: 1900-01-01

## 2024-07-31 RX ORDER — METFORMIN HYDROCHLORIDE 1000 MG/1
1000 TABLET, COATED ORAL
Qty: 180 | Refills: 2 | Status: ACTIVE | COMMUNITY
Start: 2024-07-31 | End: 1900-01-01

## 2024-07-31 RX ORDER — ATORVASTATIN CALCIUM 20 MG/1
20 TABLET, FILM COATED ORAL
Qty: 90 | Refills: 3 | Status: ACTIVE | COMMUNITY
Start: 2024-07-31 | End: 1900-01-01

## 2024-07-31 NOTE — END OF VISIT
[FreeTextEntry3] :  All medical record entries made by the Scribe were at my, Dr. Vidal Arce, direction and personally dictated by me on 07/31/2024. I have reviewed the chart and agree that the record accurately reflects my personal performance of the history, physical exam, assessment and plan. I have also personally directed, reviewed and agreed with the chart. [Time Spent: ___ minutes] : I have spent [unfilled] minutes of time on the encounter.

## 2024-07-31 NOTE — HISTORY OF PRESENT ILLNESS
[FreeTextEntry1] : 53 year old M pt, with Hx of T2DM (dx. in ~2019), referred by Alyssa Magana MD FAX [phone], presents today to establish endocrine care. Other PMHx: MI (2022), stents, CAD, HTN,  PSHx: 06/07/22 Coronary bypass surgery  FHx: DM, [place], Father with CAD  SHx: Positive smoker, no EtOH. 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 Lives with wife.    07/25/2024 Pt has POCT 235, /99 and BMI 26.5, A1c 9.0%. He lost 4 lbs in 3 months. Pt reports that he is surprised he is not doing well. He has significantly cut down carbohydrate intake and food portions. Reports -200, lowest 120. PPBS: 280-300s. Scheduled to follow up with his ophthalmologist later this week, and for dental implants in 3 weeks. Has never visited a RD.  Pt endorses nocturia, blurry vision, and right leg cramps while walking. Pt denies weight changes.  Reviewed Labs: - 04/15/24 LDL-c 130, s. creat 0.75, eGFR 107, ACR 40  07/31/2024  Pt has POCT 135, /92 and BMI 25.75. No significant weight change. CC: "I'm feeling well. I went to the pharmacy, but was unable to get my glucose meter prescription. Pt reports that he has implemented modifications to improve his lifestyle and diet.  [Medications verified as per pt on 07/31/2024] Current Medications: Ozempic 5 mg (increased from 2.5 mg qw for 6 months), Metformin 1 bid, ASA 81 mg, Atorvastatin 20 mg qd HELD: Basaglar 20 u qpm (since 07/2023) Medication modified/added this visit: Discontinue Basaglar, Increase Ozempic to 5 mg

## 2024-07-31 NOTE — PHYSICAL EXAM
[No Acute Distress] : no acute distress [Normal Sclera/Conjunctiva] : normal sclera/conjunctiva [Normal Outer Ear/Nose] : the ears and nose were normal in appearance [Thyroid Not Enlarged] : the thyroid was not enlarged [No Thyroid Nodules] : no palpable thyroid nodules [Clear to Auscultation] : lungs were clear to auscultation bilaterally [Normal Rate] : heart rate was normal [No Edema] : no peripheral edema [Soft] : abdomen soft [Spine Straight] : spine straight [No Stigmata of Cushings Syndrome] : no stigmata of Cushings Syndrome [Normal Gait] : normal gait [Normal Strength/Tone] : muscle strength and tone were normal [No Rash] : no rash [Normal Reflexes] : deep tendon reflexes were 2+ and symmetric [No Tremors] : no tremors [Oriented x3] : oriented to person, place, and time [Kyphosis] : no kyphosis present [Acanthosis Nigricans] : no acanthosis nigricans [de-identified] : Pedal pulses are decreased, right more than left.

## 2024-07-31 NOTE — HISTORY OF PRESENT ILLNESS
[FreeTextEntry1] : 53 year old M pt, with Hx of T2DM (dx. in ~2019), referred by Alyssa Magana MD FAX [phone], presents today to establish endocrine care. Other PMHx: MI (2022), stents, CAD, HTN,  PSHx: 06/07/22 Coronary bypass surgery  FHx: DM, [place], Father with CAD  SHx: Positive smoker, no EtOH. 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 Lives with wife.    07/25/2024 Pt has POCT 235, /99 and BMI 26.5, A1c 9.0%. He lost 4 lbs in 3 months. Pt reports that he is surprised he is not doing well. He has significantly cut down carbohydrate intake and food portions. Reports -200, lowest 120. PPBS: 280-300s. Scheduled to follow up with his ophthalmologist later this week, and for dental implants in 3 weeks. Has never visited a RD.  Pt endorses nocturia, blurry vision, and right leg cramps while walking. Pt denies weight changes.  Reviewed Labs: - 04/15/24 LDL-c 130, s. creat 0.75, eGFR 107, ACR 40  07/31/2024  Pt has POCT 135, /92 and BMI 25.75. No significant weight change. CC: "I'm feeling well. I went to the pharmacy, but was unable to get my glucose meter prescription. Pt reports that he has implemented modifications to improve his lifestyle and diet.  [Medications verified as per pt on 07/31/2024] Current Medications: Ozempic 5 mg (increased from 2.5 mg qw for 6 months), Metformin 1 bid, ASA 81 mg, Atorvastatin 20 mg qd HELD: Basaglar 20 u qpm (since 07/2023) Medication modified/added this visit: Discontinue Basaglar, Increase Ozempic to 5 mg

## 2024-07-31 NOTE — PHYSICAL EXAM
[No Acute Distress] : no acute distress [Normal Sclera/Conjunctiva] : normal sclera/conjunctiva [Normal Outer Ear/Nose] : the ears and nose were normal in appearance [Thyroid Not Enlarged] : the thyroid was not enlarged [No Thyroid Nodules] : no palpable thyroid nodules [Clear to Auscultation] : lungs were clear to auscultation bilaterally [Normal Rate] : heart rate was normal [No Edema] : no peripheral edema [Soft] : abdomen soft [Spine Straight] : spine straight [No Stigmata of Cushings Syndrome] : no stigmata of Cushings Syndrome [Normal Gait] : normal gait [Normal Strength/Tone] : muscle strength and tone were normal [No Rash] : no rash [Normal Reflexes] : deep tendon reflexes were 2+ and symmetric [No Tremors] : no tremors [Oriented x3] : oriented to person, place, and time [Kyphosis] : no kyphosis present [Acanthosis Nigricans] : no acanthosis nigricans [de-identified] : Pedal pulses are decreased, right more than left.

## 2024-08-01 ENCOUNTER — NON-APPOINTMENT (OUTPATIENT)
Age: 54
End: 2024-08-01

## 2024-08-05 LAB — GLUCOSE BLDC GLUCOMTR-MCNC: 135

## 2024-09-26 ENCOUNTER — NON-APPOINTMENT (OUTPATIENT)
Age: 54
End: 2024-09-26

## 2024-10-02 ENCOUNTER — NON-APPOINTMENT (OUTPATIENT)
Age: 54
End: 2024-10-02

## 2024-12-12 ENCOUNTER — NON-APPOINTMENT (OUTPATIENT)
Age: 54
End: 2024-12-12

## 2024-12-17 ENCOUNTER — APPOINTMENT (OUTPATIENT)
Dept: ENDOCRINOLOGY | Facility: CLINIC | Age: 54
End: 2024-12-17
Payer: COMMERCIAL

## 2024-12-17 VITALS
DIASTOLIC BLOOD PRESSURE: 90 MMHG | SYSTOLIC BLOOD PRESSURE: 156 MMHG | WEIGHT: 198 LBS | BODY MASS INDEX: 24.75 KG/M2 | HEART RATE: 70 BPM

## 2024-12-17 DIAGNOSIS — E11.65 TYPE 2 DIABETES MELLITUS WITH HYPERGLYCEMIA: ICD-10-CM

## 2024-12-17 LAB
GLUCOSE BLDC GLUCOMTR-MCNC: 158
HBA1C MFR BLD HPLC: 9.1

## 2024-12-17 PROCEDURE — G2211 COMPLEX E/M VISIT ADD ON: CPT | Mod: NC

## 2024-12-17 PROCEDURE — 99214 OFFICE O/P EST MOD 30 MIN: CPT

## 2024-12-17 PROCEDURE — 83036 HEMOGLOBIN GLYCOSYLATED A1C: CPT | Mod: QW

## 2025-01-07 ENCOUNTER — NON-APPOINTMENT (OUTPATIENT)
Age: 55
End: 2025-01-07

## 2025-05-08 ENCOUNTER — APPOINTMENT (OUTPATIENT)
Dept: ENDOCRINOLOGY | Facility: CLINIC | Age: 55
End: 2025-05-08
Payer: COMMERCIAL

## 2025-05-08 DIAGNOSIS — E11.65 TYPE 2 DIABETES MELLITUS WITH HYPERGLYCEMIA: ICD-10-CM

## 2025-05-08 PROCEDURE — 99214 OFFICE O/P EST MOD 30 MIN: CPT | Mod: 25,95

## 2025-05-08 RX ORDER — LISINOPRIL 10 MG/1
10 TABLET ORAL DAILY
Qty: 90 | Refills: 1 | Status: ACTIVE | COMMUNITY
Start: 2025-05-08 | End: 1900-01-01

## 2025-09-18 ENCOUNTER — RX RENEWAL (OUTPATIENT)
Age: 55
End: 2025-09-18